# Patient Record
Sex: FEMALE | Race: WHITE | NOT HISPANIC OR LATINO | Employment: OTHER | URBAN - METROPOLITAN AREA
[De-identification: names, ages, dates, MRNs, and addresses within clinical notes are randomized per-mention and may not be internally consistent; named-entity substitution may affect disease eponyms.]

---

## 2020-02-05 ENCOUNTER — OFFICE VISIT (OUTPATIENT)
Dept: INTERNAL MEDICINE | Facility: CLINIC | Age: 25
End: 2020-02-05

## 2020-02-05 ENCOUNTER — HOSPITAL ENCOUNTER (OUTPATIENT)
Dept: GENERAL RADIOLOGY | Facility: HOSPITAL | Age: 25
Discharge: HOME | End: 2020-02-05
Admitting: NURSE PRACTITIONER

## 2020-02-05 VITALS
WEIGHT: 237.4 LBS | DIASTOLIC BLOOD PRESSURE: 78 MMHG | SYSTOLIC BLOOD PRESSURE: 122 MMHG | HEIGHT: 69 IN | RESPIRATION RATE: 18 BRPM | TEMPERATURE: 98.3 F | OXYGEN SATURATION: 99 % | HEART RATE: 85 BPM | BODY MASS INDEX: 35.16 KG/M2

## 2020-02-05 DIAGNOSIS — M25.561 CHRONIC PAIN OF BOTH KNEES: Primary | ICD-10-CM

## 2020-02-05 DIAGNOSIS — M25.562 CHRONIC PAIN OF BOTH KNEES: Primary | ICD-10-CM

## 2020-02-05 DIAGNOSIS — M25.561 CHRONIC PAIN OF BOTH KNEES: ICD-10-CM

## 2020-02-05 DIAGNOSIS — G89.29 CHRONIC PAIN OF BOTH KNEES: Primary | ICD-10-CM

## 2020-02-05 DIAGNOSIS — M25.562 CHRONIC PAIN OF BOTH KNEES: ICD-10-CM

## 2020-02-05 DIAGNOSIS — G89.29 CHRONIC PAIN OF BOTH KNEES: ICD-10-CM

## 2020-02-05 PROCEDURE — 99203 OFFICE O/P NEW LOW 30 MIN: CPT | Performed by: NURSE PRACTITIONER

## 2020-02-05 PROCEDURE — 73560 X-RAY EXAM OF KNEE 1 OR 2: CPT

## 2020-02-05 RX ORDER — DESVENLAFAXINE 100 MG/1
1 TABLET, EXTENDED RELEASE ORAL DAILY
COMMUNITY
End: 2020-11-25

## 2020-02-05 RX ORDER — AZELASTINE 1 MG/ML
SPRAY, METERED NASAL
COMMUNITY
Start: 2020-02-03 | End: 2020-06-03

## 2020-02-05 RX ORDER — LEVOCETIRIZINE DIHYDROCHLORIDE 5 MG/1
5 TABLET, FILM COATED ORAL EVERY EVENING
COMMUNITY
End: 2020-06-03

## 2020-02-05 RX ORDER — NAPROXEN 500 MG/1
500 TABLET ORAL 2 TIMES DAILY WITH MEALS
Qty: 60 TABLET | Refills: 0 | Status: SHIPPED | OUTPATIENT
Start: 2020-02-05 | End: 2020-03-04

## 2020-02-05 NOTE — PROGRESS NOTES
Subjective   Melania Stevens is a 24 y.o. female here to establish care.  Chief Complaint   Patient presents with   • Establish Care   • Knee Pain     bilateral knee pain since November       Knee Pain    The incident occurred more than 1 week ago. The injury mechanism is unknown. The pain is present in the right knee and left knee. The pain is at a severity of 8/10. The pain has been fluctuating since onset. Associated symptoms include a loss of motion. Pertinent negatives include no inability to bear weight, loss of sensation, muscle weakness, numbness or tingling. Associated symptoms comments: Difficulty going up and down steps  . The symptoms are aggravated by weight bearing. She has tried acetaminophen for the symptoms. The treatment provided no relief.   Patient reports that she has had injuries to her knees since 2014.  She had a left knee dislocation in 2014.  She underwent some extensive physical therapy which improved her symptoms.  She recently has been experiencing some more pain in that left knee and feels as though that it has dislocated again.  She is also began having pain in the right knee since then but feels that this may be compensatory.  She has had no recent imaging.  She has tried Tylenol over-the-counter and reports this not relieve her pain.  She denies any numbness or tingling.  She does have some difficulty going up and down steps and feels as though the knee itself is weak but it does not buckle or give way.  She feels as though that her weight has been increasing over the last several years and this is contributing to the ongoing knee pain.    The following portions of the patient's history were reviewed and updated as appropriate: allergies, current medications, past family history, past medical history, past social history, past surgical history and problem list.    Review of Systems   Constitutional: Negative for appetite change, chills, fatigue and unexpected weight change.   HENT:  Negative for ear pain and sore throat.    Respiratory: Negative for cough, chest tightness, shortness of breath and wheezing.    Cardiovascular: Negative for chest pain, palpitations and leg swelling.   Gastrointestinal: Negative for abdominal pain, constipation, diarrhea, nausea and vomiting.   Genitourinary: Negative for difficulty urinating and dysuria.   Musculoskeletal: Positive for arthralgias, gait problem and joint swelling. Negative for back pain, myalgias, neck pain and neck stiffness.   Skin: Negative for color change and rash.   Neurological: Positive for weakness ( Left knee). Negative for dizziness, tingling, syncope, facial asymmetry, numbness and headaches.   Psychiatric/Behavioral: Negative for sleep disturbance.         No Known Allergies  Past Medical History:   Diagnosis Date   • Depression    • Fractures    • Pap smear for cervical cancer screening      Past Surgical History:   Procedure Laterality Date   • WISDOM TOOTH EXTRACTION       Family History   Problem Relation Age of Onset   • Migraines Mother    • Stroke Paternal Uncle    • Arthritis Maternal Grandmother      Social History     Socioeconomic History   • Marital status: Single     Spouse name: Not on file   • Number of children: Not on file   • Years of education: Not on file   • Highest education level: Not on file   Tobacco Use   • Smoking status: Never Smoker   • Smokeless tobacco: Never Used   Substance and Sexual Activity   • Alcohol use: Never     Frequency: Never     Immunization History   Administered Date(s) Administered   • Tdap 01/01/2015   • flucelvax quad pfs =>4 YRS 12/07/2019       Current Outpatient Medications:   •  azelastine (ASTELIN) 0.1 % nasal spray, , Disp: , Rfl:   •  Desvenlafaxine  MG tablet sustained-release 24 hour, Take 1 tablet by mouth Daily., Disp: , Rfl:   •  levocetirizine (XYZAL) 5 MG tablet, Take 5 mg by mouth Every Evening., Disp: , Rfl:   •  Multiple Vitamins-Minerals (MULTIVITAMIN ADULT PO),  "Take 1 tablet by mouth Daily., Disp: , Rfl:   •  naproxen (NAPROSYN) 500 MG tablet, Take 1 tablet by mouth 2 (Two) Times a Day With Meals., Disp: 60 tablet, Rfl: 0    Objective   Blood pressure 122/78, pulse 85, temperature 98.3 °F (36.8 °C), resp. rate 18, height 175.9 cm (69.25\"), weight 108 kg (237 lb 6.4 oz), last menstrual period 01/20/2020, SpO2 99 %, not currently breastfeeding.  Physical Exam   Constitutional: She is oriented to person, place, and time. She appears well-developed and well-nourished.   HENT:   Head: Normocephalic and atraumatic.   Eyes: Pupils are equal, round, and reactive to light. Conjunctivae are normal.   Neck: Normal range of motion.   Cardiovascular: Normal rate, regular rhythm and normal heart sounds.   Pulmonary/Chest: Effort normal and breath sounds normal.   Abdominal: Soft. Normal appearance and bowel sounds are normal. There is no tenderness.   Musculoskeletal:        Right knee: She exhibits effusion. She exhibits normal range of motion, no swelling, no ecchymosis, no deformity, no laceration, no erythema, normal alignment, no LCL laxity, normal patellar mobility, no bony tenderness, normal meniscus and no MCL laxity. No tenderness found.        Left knee: She exhibits decreased range of motion, swelling and effusion. She exhibits no ecchymosis, no deformity, no laceration, no erythema, normal alignment, no LCL laxity, normal patellar mobility, no bony tenderness, normal meniscus and no MCL laxity. No tenderness found.   Neurological: She is alert and oriented to person, place, and time.   Skin: Skin is warm and dry.   Psychiatric: She has a normal mood and affect. Her behavior is normal. Judgment and thought content normal.       Assessment/Plan   Melania was seen today for establish care and knee pain.    Diagnoses and all orders for this visit:    Chronic pain of both knees  -     XR Knee 1 or 2 View Bilateral; Future  -     naproxen (NAPROSYN) 500 MG tablet; Take 1 tablet by " mouth 2 (Two) Times a Day With Meals.  -     Ambulatory Referral to Orthopedic Surgery    Will check x-rays of her knees, start on naproxen for symptomatic relief.  Referred to orthopedics for further management.       Return in about 6 weeks (around 3/18/2020) for Annual, with fasting labs.  Plan of care discussed with pt. They verbalized understanding and agreement.       * Please note that portions of this note were completed with a voice recognition program. Efforts were made to edit the dictation but occasionally words are erroneously transcribed.

## 2020-02-07 ENCOUNTER — TELEPHONE (OUTPATIENT)
Dept: INTERNAL MEDICINE | Facility: CLINIC | Age: 25
End: 2020-02-07

## 2020-02-07 NOTE — TELEPHONE ENCOUNTER
----- Message from DOMENIC Wilkinson sent at 2/6/2020  9:16 PM EST -----  Please let her know knee xr show no dislocation but do show effusion (fluid). Will have her see ortho as planned.

## 2020-02-10 ENCOUNTER — TELEPHONE (OUTPATIENT)
Dept: INTERNAL MEDICINE | Facility: CLINIC | Age: 25
End: 2020-02-10

## 2020-02-10 RX ORDER — DESVENLAFAXINE 100 MG/1
1 TABLET, EXTENDED RELEASE ORAL DAILY
Qty: 30 TABLET | Status: CANCELLED | OUTPATIENT
Start: 2020-02-10

## 2020-02-10 NOTE — TELEPHONE ENCOUNTER
Pt says her psychiatrist is now out her network and can't get her medication refilled.  Pt is wanting to see if [facility] will refill it for her.  Pt is requesting her Desvenlafaxine ER refilled and have it sent to [facility].  Pt is requesting a call back to know whether it can be filled or not.

## 2020-02-11 NOTE — TELEPHONE ENCOUNTER
Pt should still be able to get refilled from her psychiatrist until she can establish with a new one. She can use [facility] as well if it is a cost issue at the pharmacy   I would have to see her to discuss before I could prescribe since this was not addressed by me.

## 2020-02-11 NOTE — TELEPHONE ENCOUNTER
PN.  She stated understanding and will try to call him to get a refill.  She will call back to schedule an appointment to discuss

## 2020-02-12 ENCOUNTER — OFFICE VISIT (OUTPATIENT)
Dept: ORTHOPEDIC SURGERY | Facility: CLINIC | Age: 25
End: 2020-02-12

## 2020-02-12 VITALS — OXYGEN SATURATION: 100 % | BODY MASS INDEX: 35.27 KG/M2 | HEIGHT: 69 IN | HEART RATE: 100 BPM | WEIGHT: 238.1 LBS

## 2020-02-12 DIAGNOSIS — M25.562 PAIN IN BOTH KNEES, UNSPECIFIED CHRONICITY: Primary | ICD-10-CM

## 2020-02-12 DIAGNOSIS — M25.561 PAIN IN BOTH KNEES, UNSPECIFIED CHRONICITY: Primary | ICD-10-CM

## 2020-02-12 DIAGNOSIS — M25.362 PATELLAR INSTABILITY OF LEFT KNEE: ICD-10-CM

## 2020-02-12 PROCEDURE — 99204 OFFICE O/P NEW MOD 45 MIN: CPT | Performed by: ORTHOPAEDIC SURGERY

## 2020-02-12 NOTE — PROGRESS NOTES
[facility] [place] [place] [facility] [place]    Subjective     Chief Complaint   Patient presents with   • Left Knee - Pain   • Right Knee - Pain        HPI  Melania Stevens is a 24 y.o. female who presents with bilateral knee pain.  Onset: twisting injury. The issue has been ongoing for 3 month(s). Pain is a 8/10 on the pain scale. Pain is described as dull, aching, throbbing, stabbing and shooting. Associated symptoms include pain, swelling, popping, grinding, stiffness and giving way/buckling. The pain is worse with standing, sitting and climbing stairs; ice, heat and pain medication and/or NSAID improve the pain. Previous treatments have included: bracing and NSAIDS.    I have reviewed the following portions of the patient's history:History of Present Illness    She initially had a patella dislocation 2014.  She was treated with physical therapy and bracing.  She has had multiple dislocations since November 2019.  She has failed bracing and therapy.  She has not had an MRI recently.  She is tried naproxen.      Past Medical History:   Diagnosis Date   • Depression    • Fractures    • Pap smear for cervical cancer screening       Past Surgical History:   Procedure Laterality Date   • WISDOM TOOTH EXTRACTION        Family History   Problem Relation Age of Onset   • Migraines Mother    • Stroke Paternal Uncle    • Arthritis Maternal Grandmother      Social History     Socioeconomic History   • Marital status: Single     Spouse name: Not on file   • Number of children: Not on file   • Years of education: Not on file   • Highest education level: Not on file   Tobacco Use   • Smoking status: Never Smoker   • Smokeless tobacco: Never Used   Substance and Sexual Activity   • Alcohol use: Never     Frequency: Never      Current Outpatient Medications on File Prior to Visit   Medication Sig Dispense Refill   • azelastine (ASTELIN) 0.1 % nasal spray      • Desvenlafaxine  MG tablet sustained-release 24 hour Take 1 tablet  "by mouth Daily.     • levocetirizine (XYZAL) 5 MG tablet Take 5 mg by mouth Every Evening.     • Multiple Vitamins-Minerals (MULTIVITAMIN ADULT PO) Take 1 tablet by mouth Daily.     • naproxen (NAPROSYN) 500 MG tablet Take 1 tablet by mouth 2 (Two) Times a Day With Meals. 60 tablet 0     No current facility-administered medications on file prior to visit.       No Known Allergies     The following portions of the patient's history were reviewed and updated as appropriate: allergies, current medications, past family history, past medical history, past social history, past surgical history and problem list.    Review of Systems   Constitutional: Negative.    HENT: Negative.    Eyes: Negative.    Respiratory: Negative.    Cardiovascular: Negative.    Gastrointestinal: Negative.    Endocrine: Negative.    Genitourinary: Negative.    Musculoskeletal: Positive for arthralgias.   Skin: Negative.    Allergic/Immunologic: Negative.    Neurological: Negative.    Hematological: Negative.    Psychiatric/Behavioral: Negative.         Objective      Physical Exam  Pulse 100   Ht 175.9 cm (69.25\")   Wt 108 kg (238 lb 1.6 oz)   LMP 01/20/2020 (Approximate)   SpO2 100%   BMI 34.91 kg/m²     Body mass index is 34.91 kg/m².    GENERAL APPEARANCE: awake, alert & oriented x 3, in no acute distress and well developed, well nourished  PSYCH: normal mood and affect  LUNGS:  breathing nonlabored, no wheezing  EYES: sclera anicteric, pupils equal  CARDIOVASCULAR: palpable pulses dorsalis pedis, palpable posterior tibial bilaterally. Capillary refill less than 2 seconds  INTEGUMENTARY: skin intact, no clubbing, cyanosis  NEUROLOGIC:  Normal Sensation and reflexes       Ortho Exam  Peripheral Vascular:    Upper Extremity:   Inspection:  Left--no cyanotic nail beds Right--no cyanotic nail beds   Bilateral:  Pink nail beds with brisk capillary refill   Palpation:  Bilateral radial pulse normal    Musculoskeletal:  Global Assessment:  " Overall assessment of Lower Extremity Muscle Strength and Tone:  Left quadriceps--5/5   Left hamstrings--5/5       Left tibialis anterior--5/5  Left gastroc-soleus--5/5  Left EHL--5/5    Right quadriceps--5/5  Right hamstrings--5/5  Right tibialis anterior--5/5  Right gastroc soleus--5/5  Right EHL--5/5    Lower Extremity:  Knee/Patella:  No digital clubbing or cyanosis.    Examination of left and right knees reveals:  Normal deep tendon reflexes, coordination, strength, tone, sensation.  No known fractures or deformities.    Inspection and Palpation:    Left knee:  Tenderness:  none  Effusion:  none  Crepitus:  none  Pulses:  2+  Ecchymosis:  None  Warmth:  None     Right knee:  Tenderness:  none  Effusion:  none  Crepitus:  none  Pulses:  2+  Ecchymosis:  None  Warmth:  None     ROM:  Right:  Extension:0    Flexion:135  Left:  Extension:0     Flexion:135    Instability:    Left:  Lachman Test:  Negative, Varus stress test negative, Valgus stress test negative   Anterior Drawer Test:  Negative, Posterior Drawer Test:  Negative    Right:  Lachman Test:  Negative, Varus stress test negative, Valgus stress test negative   Anterior Drawer Test:  Negative, Posterior Drawer Test:  Negative    Deformities/Malalignments/Discrepancies:    Left:  none  Right:  none    Functional Testing:  Right:  Yamini's test:  Negative  Patella grind test:  Negative  Q-angle:  Normal  Apprehension Sign:  Negative      Left:  Yamini's test:  Negative  Patella grind test:  Negative  Q-angle:  Normal  Apprehension Sign:  Negative    Imaging/Studies  Imaging Results (Last 7 Days)     Procedure Component Value Units Date/Time    XR Knee 1 or 2 View Bilateral [[id number]] Resulted:  02/12/20 0930     Updated:  02/12/20 0931    Narrative:       Bilateral Knee X-Rays  Indication: Pain    Upright AP, Lateral, skiers and   Findings: Evidence of prior healed fracture of MPFL injury to left patella     No bony lesion  Normal soft tissues  Normal  joint spaces    No prior studies were available for comparison.            I viewed knee x-rays from February 6 AP and lateral with no acute pathology  Assessment/Plan        ICD-10-CM ICD-9-CM   1. Pain in both knees, unspecified chronicity M25.561 719.46    M25.562    2. Patellar instability of left knee M25.362 718.86       Orders Placed This Encounter   Procedures   • XR Knee 1 or 2 View Bilateral   • MRI Knee Left Without Contrast      She has a history of chronic left patella instability with an initial dislocation in 2014 and recurrent dislocation in November 2019.  She is failed prior physical therapy and bracing.  Now she has significant recurrent dislocations left knee.  I have ordered an MRI to evaluate the integrity of the MPFL and will see her back after that.  She is going to need a surgical reconstruction of her MPFL.  I have placed her in a brace for stability until we can get the MRI    Medical Decision Making  Management Options : over-the-counter medicine and close treatment of fracture or dislocation  Data/Risk: radiology tests and independent visualization of imaging, lab tests, or EMG/NCV    Juanito Dai MD  02/12/20  9:40 AM         EMR Dragon/Transcription disclaimer:  Much of this encounter note is an electronic transcription of spoken language to printed text. Electronic transcription of spoken language may permit erroneous, or at times, nonsensical words or phrases to be inadvertently transcribed. Although I have reviewed the note for such errors, some may still exist.      Answers for HPI/ROS submitted by the patient on 2/10/2020   Lower extremity pain  What is the primary reason for your visit?: Lower Extremity Injury  Incident occurred: more than 1 week ago  Incident location: other  Injury mechanism: a fall, a twisting injury, other  Pain location: left knee, right knee  Pain quality: aching, shooting, stabbing  Pain - numeric: 7/10  Pain course: worsening  tingling:  No  inability to bear weight: Yes  loss of motion: Yes  loss of sensation: No  muscle weakness: Yes  Foreign body present: no foreign bodies  Aggravated by: movement, palpation, weight bearing

## 2020-02-25 DIAGNOSIS — M25.362 PATELLAR INSTABILITY OF LEFT KNEE: ICD-10-CM

## 2020-02-26 ENCOUNTER — OFFICE VISIT (OUTPATIENT)
Dept: ORTHOPEDIC SURGERY | Facility: CLINIC | Age: 25
End: 2020-02-26

## 2020-02-26 VITALS — BODY MASS INDEX: 35.27 KG/M2 | OXYGEN SATURATION: 100 % | WEIGHT: 238.1 LBS | HEIGHT: 69 IN | HEART RATE: 110 BPM

## 2020-02-26 DIAGNOSIS — M25.561 PAIN IN BOTH KNEES, UNSPECIFIED CHRONICITY: ICD-10-CM

## 2020-02-26 DIAGNOSIS — M25.562 PAIN IN BOTH KNEES, UNSPECIFIED CHRONICITY: ICD-10-CM

## 2020-02-26 DIAGNOSIS — M25.362 PATELLAR INSTABILITY OF LEFT KNEE: Primary | ICD-10-CM

## 2020-02-26 DIAGNOSIS — Q68.2 PATELLA ALTA: ICD-10-CM

## 2020-02-26 PROCEDURE — 99213 OFFICE O/P EST LOW 20 MIN: CPT | Performed by: ORTHOPAEDIC SURGERY

## 2020-02-26 NOTE — PROGRESS NOTES
[facility] – [place] [facility] Note    Subjective     Chief Complaint   Patient presents with   • Follow-up     2 week follow up to MRI (2/24/20) Left knee - Patellar instability of left knee and Chronic pain of both knees        HPI  Melania Stevens is a 24 y.o. female.  She is follow-up MRI left knee.  She is had patella dislocations and knee problems since childhood.  Pain is 6 out of 10 now.    Past Medical History:   Diagnosis Date   • Depression    • Fractures    • Pap smear for cervical cancer screening       Past Surgical History:   Procedure Laterality Date   • WISDOM TOOTH EXTRACTION        Family History   Problem Relation Age of Onset   • Migraines Mother    • Stroke Paternal Uncle    • Arthritis Maternal Grandmother      Social History     Socioeconomic History   • Marital status: Single     Spouse name: Not on file   • Number of children: Not on file   • Years of education: Not on file   • Highest education level: Not on file   Tobacco Use   • Smoking status: Never Smoker   • Smokeless tobacco: Never Used   Substance and Sexual Activity   • Alcohol use: Never     Frequency: Never      Current Outpatient Medications on File Prior to Visit   Medication Sig Dispense Refill   • amoxicillin-clavulanate (AUGMENTIN) 875-125 MG per tablet Take 1 tablet by mouth 2 (Two) Times a Day for 7 days. 14 tablet 0   • azelastine (ASTELIN) 0.1 % nasal spray      • Desvenlafaxine  MG tablet sustained-release 24 hour Take 1 tablet by mouth Daily.     • levocetirizine (XYZAL) 5 MG tablet Take 5 mg by mouth Every Evening.     • Multiple Vitamins-Minerals (MULTIVITAMIN ADULT PO) Take 1 tablet by mouth Daily.     • naproxen (NAPROSYN) 500 MG tablet Take 1 tablet by mouth 2 (Two) Times a Day With Meals. 60 tablet 0     No current facility-administered medications on file prior to visit.       No Known Allergies     The following portions of the patient's history were reviewed and updated as appropriate: allergies,  "current medications, past family history, past medical history, past social history, past surgical history and problem list.    Review of Systems   Constitutional: Negative.    HENT: Positive for sinus pressure, sneezing and sore throat.    Eyes: Negative.    Respiratory: Negative.    Cardiovascular: Negative.    Gastrointestinal: Negative.    Endocrine: Negative.    Genitourinary: Negative.    Musculoskeletal: Positive for arthralgias, back pain and joint swelling.   Skin: Negative.    Allergic/Immunologic: Positive for environmental allergies.   Neurological: Negative.    Hematological: Negative.    Psychiatric/Behavioral: Negative.         Objective      Physical Exam  Pulse 110   Ht 175.9 cm (69.25\")   Wt 108 kg (238 lb 1.6 oz)   SpO2 100%   BMI 34.91 kg/m²     Body mass index is 34.91 kg/m².    GENERAL APPEARANCE: awake, alert & oriented x 3, in no acute distress and well developed, well nourished  PSYCH: normal mood and affect  No change in her exam.  She has patella rupinder with laxity of MPFL    Imaging/Studies  Imaging Results (Last 7 Days)     ** No results found for the last 168 hours. **      I viewed her MRI from February 24 which shows patella rupinder no ligament tears and a bone bruise.  She also has trochlear dysplasia    Assessment/Plan        ICD-10-CM ICD-9-CM   1. Patellar instability of left knee M25.362 718.86   2. Pain in both knees, unspecified chronicity M25.561 719.46    M25.562    3. 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  She does not have a tear to the medial patellofemoral ligament but it appears lax.  I recommend physical therapy with VMO strengthening.  Continue her patella stabilizing knee sleeve and follow-up in a month.  If she fails additional therapy surgical reconstruction of " the MPFL is an option.  Surgery may not have a great result in her case because of the patella rupinder trochlear dysplasia  Medical Decision Making  Management Options : over-the-counter medicine and physical/occupational therapy  Data/Risk: radiology tests and independent visualization of imaging, lab tests, or EMG/NCV    Juanito Dai MD  02/26/20  9:33 AM         EMR Dragon/Transcription disclaimer:  Much of this encounter note is an electronic transcription of spoken language to printed text. Electronic transcription of spoken language may permit erroneous, or at times, nonsensical words or phrases to be inadvertently transcribed. Although I have reviewed the note for such errors, some may still exist.

## 2020-03-04 DIAGNOSIS — G89.29 CHRONIC PAIN OF BOTH KNEES: ICD-10-CM

## 2020-03-04 DIAGNOSIS — M25.562 CHRONIC PAIN OF BOTH KNEES: ICD-10-CM

## 2020-03-04 DIAGNOSIS — M25.561 CHRONIC PAIN OF BOTH KNEES: ICD-10-CM

## 2020-03-04 RX ORDER — NAPROXEN 500 MG/1
TABLET ORAL
Qty: 60 TABLET | Refills: 0 | Status: SHIPPED | OUTPATIENT
Start: 2020-03-04 | End: 2020-04-06

## 2020-03-17 ENCOUNTER — OFFICE VISIT (OUTPATIENT)
Dept: INTERNAL MEDICINE | Facility: CLINIC | Age: 25
End: 2020-03-17

## 2020-03-17 VITALS
HEIGHT: 68 IN | WEIGHT: 238 LBS | DIASTOLIC BLOOD PRESSURE: 86 MMHG | OXYGEN SATURATION: 99 % | TEMPERATURE: 97.3 F | RESPIRATION RATE: 16 BRPM | BODY MASS INDEX: 36.07 KG/M2 | HEART RATE: 93 BPM | SYSTOLIC BLOOD PRESSURE: 126 MMHG

## 2020-03-17 DIAGNOSIS — E55.9 VITAMIN D DEFICIENCY: ICD-10-CM

## 2020-03-17 DIAGNOSIS — Z12.4 CERVICAL CANCER SCREENING: ICD-10-CM

## 2020-03-17 DIAGNOSIS — F31.9 BIPOLAR DEPRESSION (HCC): ICD-10-CM

## 2020-03-17 DIAGNOSIS — Z00.00 ANNUAL PHYSICAL EXAM: Primary | ICD-10-CM

## 2020-03-17 DIAGNOSIS — E66.09 CLASS 2 OBESITY DUE TO EXCESS CALORIES WITHOUT SERIOUS COMORBIDITY WITH BODY MASS INDEX (BMI) OF 36.0 TO 36.9 IN ADULT: ICD-10-CM

## 2020-03-17 DIAGNOSIS — R53.82 CHRONIC FATIGUE: ICD-10-CM

## 2020-03-17 DIAGNOSIS — E53.8 B12 DEFICIENCY: ICD-10-CM

## 2020-03-17 PROCEDURE — 99395 PREV VISIT EST AGE 18-39: CPT | Performed by: NURSE PRACTITIONER

## 2020-03-17 NOTE — PROGRESS NOTES
Patient Care Team:  She Beaver APRN as PCP - General (Nurse Practitioner)  Sung Bang Jr., MD (Psychiatry)  Juanito Dai MD as Consulting Physician (Orthopedic Surgery)     Chief complaint: Patient is in today for a physical          Patient is a 24 y.o. female who presents for her yearly physical exam.     HPI      Chronic knee pain - getting better with PT. Follow ing with Dr. Dai    Chronic fatigue, weight gain, poor diet. Eating healthier for the last 2 weeks and feels a bit better.     Health maintenance/lifestyle:    Influenza: 2019  Tdap: 2015  SBE- does not do regularly, no concerning areas.     Pap: due  Menses:Patient's last menstrual period was 03/10/2020 (approximate).      Diet: recently started eating healthier  Caffeine: none  Exercise: has recently started doing low impact exercises  Follows with psych for chronic bipolar depression. Currently on desvenlafaxine.  Reports she feels like symptoms are fairly well controlled.    PHQ-9 Depression Screening  Little interest or pleasure in doing things? 1(bipolar depression)   Feeling down, depressed, or hopeless? 1   Trouble falling or staying asleep, or sleeping too much? 1   Feeling tired or having little energy? 3   Poor appetite or overeating? 2   Feeling bad about yourself - or that you are a failure or have let yourself or your family down? 1   Trouble concentrating on things, such as reading the newspaper or watching television? 3   Moving or speaking so slowly that other people could have noticed? Or the opposite - being so fidgety or restless that you have been moving around a lot more than usual? 1   Thoughts that you would be better off dead, or of hurting yourself in some way? 0   PHQ-9 Total Score 13   If you checked off any problems, how difficult have these problems made it for you to do your work, take care of things at home, or get along with other people? Somewhat difficult         Review of Systems    Constitutional: Positive for fatigue. Negative for appetite change, chills, diaphoresis, fever and unexpected weight change.   HENT: Negative for congestion, ear pain, rhinorrhea, sinus pressure and sore throat.    Eyes: Negative for itching and visual disturbance.   Respiratory: Negative for cough, chest tightness, shortness of breath and wheezing.    Cardiovascular: Negative for chest pain, palpitations and leg swelling.   Gastrointestinal: Negative for abdominal pain, constipation, diarrhea, nausea and vomiting.        Occasional heartburn/reflux   Endocrine: Negative for cold intolerance, heat intolerance, polydipsia, polyphagia and polyuria.   Genitourinary: Negative for difficulty urinating, dysuria and hematuria.   Musculoskeletal: Negative for arthralgias, back pain, joint swelling and myalgias.   Skin: Negative for color change, rash and wound.   Allergic/Immunologic: Negative for environmental allergies and food allergies.   Neurological: Negative for dizziness, syncope, weakness, light-headedness, numbness and headaches.   Hematological: Negative for adenopathy. Does not bruise/bleed easily.   Psychiatric/Behavioral: Positive for decreased concentration. Negative for agitation, behavioral problems, dysphoric mood and sleep disturbance. The patient is nervous/anxious.            History  Past Medical History:   Diagnosis Date   • Allergic as a kid    seasonal chronic acute sinusitis - some times leads to strep   • Anxiety first noticed in middle school    coping   • Depression    • Fractures    • Headache middle school    now know triggers to avoid-unable to avoid during pd & wk bf   • Pap smear for cervical cancer screening       Past Surgical History:   Procedure Laterality Date   • WISDOM TOOTH EXTRACTION        No Known Allergies   Family History   Problem Relation Age of Onset   • Migraines Mother    • Miscarriages / Stillbirths Mother         abt 3 miscarriages bf me   • Stroke Mother         TIA -  "inconclusive, but potentially stress related   • Arthritis Father    • Mental illness Father         ADHD (some anxiety managed in that)   • Stroke Paternal Uncle         heavy stress & unhealthy eating clogged 1 or 2 heart valves   • Arthritis Maternal Grandmother      Social History     Socioeconomic History   • Marital status: Single     Spouse name: Not on file   • Number of children: Not on file   • Years of education: Not on file   • Highest education level: Not on file   Tobacco Use   • Smoking status: Never Smoker   • Smokeless tobacco: Never Used   Substance and Sexual Activity   • Alcohol use: Not Currently     Alcohol/week: 0.0 standard drinks     Frequency: Never     Comment: very rarely/on occasion   • Drug use: Never   • Sexual activity: Never        Current Outpatient Medications:   •  azelastine (ASTELIN) 0.1 % nasal spray, , Disp: , Rfl:   •  Desvenlafaxine  MG tablet sustained-release 24 hour, Take 1 tablet by mouth Daily., Disp: , Rfl:   •  levocetirizine (XYZAL) 5 MG tablet, Take 5 mg by mouth Every Evening., Disp: , Rfl:   •  Multiple Vitamins-Minerals (MULTIVITAMIN ADULT PO), Take 1 tablet by mouth Daily., Disp: , Rfl:   •  naproxen (NAPROSYN) 500 MG tablet, TAKE ONE TABLET BY MOUTH TWICE A DAY WITH MEALS, Disp: 60 tablet, Rfl: 0   Immunization History   Administered Date(s) Administered   • Tdap 01/01/2015   • flucelvax quad pfs =>4 YRS 12/07/2019                   /86   Pulse 93   Temp 97.3 °F (36.3 °C)   Resp 16   Ht 172.7 cm (68\")   Wt 108 kg (238 lb)   LMP 03/10/2020 (Approximate)   SpO2 99%   Breastfeeding No   BMI 36.19 kg/m²       Physical Exam   Constitutional: She is oriented to person, place, and time. She appears well-developed and well-nourished. No distress.   HENT:   Head: Normocephalic and atraumatic.   Right Ear: External ear normal.   Left Ear: External ear normal.   Mouth/Throat: Oropharynx is clear and moist. No oropharyngeal exudate.   Eyes: " Conjunctivae and EOM are normal. Right eye exhibits no discharge. Left eye exhibits no discharge. No scleral icterus.   Neck: Normal range of motion. Neck supple. No JVD (no bruits) present. No tracheal deviation present. No thyromegaly present.   Cardiovascular: Normal rate, regular rhythm, normal heart sounds and intact distal pulses. Exam reveals no friction rub.   No murmur heard.  Pulmonary/Chest: Effort normal and breath sounds normal. No respiratory distress. She has no wheezes. She has no rales. She exhibits no tenderness. Right breast exhibits no inverted nipple, no mass, no nipple discharge, no skin change and no tenderness. Left breast exhibits no inverted nipple, no mass, no nipple discharge, no skin change and no tenderness. Breasts are symmetrical.   Abdominal: Soft. Normal appearance and bowel sounds are normal. She exhibits no distension and no mass. There is no hepatosplenomegaly. There is no tenderness. No hernia.   Genitourinary: Vagina normal and uterus normal. There is no rash, tenderness, lesion or injury on the right labia. There is no rash, tenderness, lesion or injury on the left labia. Cervix exhibits no motion tenderness, no discharge and no friability. Right adnexum displays no mass, no tenderness and no fullness. Left adnexum displays no mass, no tenderness and no fullness.   Musculoskeletal: Normal range of motion. She exhibits no edema, tenderness or deformity.   Lymphadenopathy:     She has no cervical adenopathy.   Neurological: She is alert and oriented to person, place, and time. She has normal reflexes. She displays normal reflexes.   Skin: Skin is warm and dry. No rash noted. She is not diaphoretic. No erythema. No pallor.   Psychiatric: She has a normal mood and affect. Her behavior is normal. Thought content normal.   Nursing note and vitals reviewed.                Diagnoses and all orders for this visit:    Annual physical exam  -     CBC (No Diff)  -     Comprehensive  Metabolic Panel  -     Lipid Panel  -     TSH  -     Vitamin B12  -     Vitamin D 25 Hydroxy  -     Liquid-based Pap Smear, Screening    Vitamin D deficiency  -     Vitamin D 25 Hydroxy    B12 deficiency  -     Vitamin B12    Chronic fatigue  -     CBC (No Diff)  -     Comprehensive Metabolic Panel  -     Lipid Panel  -     TSH  -     Vitamin B12  -     Vitamin D 25 Hydroxy    Cervical cancer screening  -     Liquid-based Pap Smear, Screening    Bipolar depression (CMS/HCC)  -     CBC (No Diff)  -     Comprehensive Metabolic Panel  -     Lipid Panel  -     TSH  -     Vitamin B12  -     Vitamin D 25 Hydroxy    Class 2 obesity due to excess calories without serious comorbidity with body mass index (BMI) of 36.0 to 36.9 in adult         Labs sent as above- will notify of results and treat accordingly. If patient has not received results within one week, they will notify my office    Immunizations and screenings : Up-to-date as noted in the HPI, Pap and breast exam completed in office today  Counseling: Diet and exercise for weight reduction  Follow up: Return in about 1 year (around 3/17/2021) for Annual.  Plan of care discussed with pt. They verbalized understanding and agreement.     She Beaver, APRN              * Please note that portions of this note were completed with a voice recognition program. Efforts were made to edit the dictation but occasionally words are erroneously transcribed.

## 2020-03-18 ENCOUNTER — TELEPHONE (OUTPATIENT)
Dept: INTERNAL MEDICINE | Facility: CLINIC | Age: 25
End: 2020-03-18

## 2020-03-18 LAB
25(OH)D3+25(OH)D2 SERPL-MCNC: 35.8 NG/ML (ref 30–100)
ALBUMIN SERPL-MCNC: 4.5 G/DL (ref 3.5–5.2)
ALBUMIN/GLOB SERPL: 2 G/DL
ALP SERPL-CCNC: 56 U/L (ref 39–117)
ALT SERPL-CCNC: 19 U/L (ref 1–33)
AST SERPL-CCNC: 16 U/L (ref 1–32)
BILIRUB SERPL-MCNC: 0.5 MG/DL (ref 0.2–1.2)
BUN SERPL-MCNC: 10 MG/DL (ref 6–20)
BUN/CREAT SERPL: 11.9 (ref 7–25)
CALCIUM SERPL-MCNC: 9.4 MG/DL (ref 8.6–10.5)
CHLORIDE SERPL-SCNC: 104 MMOL/L (ref 98–107)
CHOLEST SERPL-MCNC: 204 MG/DL (ref 0–200)
CO2 SERPL-SCNC: 22.3 MMOL/L (ref 22–29)
CREAT SERPL-MCNC: 0.84 MG/DL (ref 0.57–1)
ERYTHROCYTE [DISTWIDTH] IN BLOOD BY AUTOMATED COUNT: 13.6 % (ref 12.3–15.4)
GLOBULIN SER CALC-MCNC: 2.2 GM/DL
GLUCOSE SERPL-MCNC: 94 MG/DL (ref 65–99)
HCT VFR BLD AUTO: 44 % (ref 34–46.6)
HDLC SERPL-MCNC: 59 MG/DL (ref 40–60)
HGB BLD-MCNC: 15.2 G/DL (ref 12–15.9)
LDLC SERPL CALC-MCNC: 113 MG/DL (ref 0–100)
MCH RBC QN AUTO: 28.5 PG (ref 26.6–33)
MCHC RBC AUTO-ENTMCNC: 34.5 G/DL (ref 31.5–35.7)
MCV RBC AUTO: 82.4 FL (ref 79–97)
PLATELET # BLD AUTO: 252 10*3/MM3 (ref 140–450)
POTASSIUM SERPL-SCNC: 4.4 MMOL/L (ref 3.5–5.2)
PROT SERPL-MCNC: 6.7 G/DL (ref 6–8.5)
RBC # BLD AUTO: 5.34 10*6/MM3 (ref 3.77–5.28)
SODIUM SERPL-SCNC: 139 MMOL/L (ref 136–145)
TRIGL SERPL-MCNC: 159 MG/DL (ref 0–150)
TSH SERPL DL<=0.005 MIU/L-ACNC: 3.27 UIU/ML (ref 0.27–4.2)
VIT B12 SERPL-MCNC: 505 PG/ML (ref 211–946)
VLDLC SERPL CALC-MCNC: 31.8 MG/DL
WBC # BLD AUTO: 4.34 10*3/MM3 (ref 3.4–10.8)

## 2020-03-18 NOTE — TELEPHONE ENCOUNTER
----- Message from DOMENIC Wilkinson sent at 3/18/2020 12:47 PM EDT -----  Please let the patient know that her labs are stable/in acceptable ranges except her cholesterol is elevated.  I would recommend healthy diet and exercise for weight reduction to treat the elevation in her cholesterol.  And we can recheck in about 6 months or so  Patient should consume lean meats, whole grains, vegetables, and fruits, while avoiding concentrated sweets, junk foods, and fast foods.  Should also be sure to consume plenty of water.  Patient should engage in a minimum of 150 minutes of moderate intensity exercise per week as well.

## 2020-03-20 ENCOUNTER — TELEPHONE (OUTPATIENT)
Dept: INTERNAL MEDICINE | Facility: CLINIC | Age: 25
End: 2020-03-20

## 2020-03-20 NOTE — TELEPHONE ENCOUNTER
----- Message from DOMENIC Wilkinson sent at 3/20/2020  9:11 AM EDT -----  Please let her know that her Pap was normal.

## 2020-04-05 DIAGNOSIS — G89.29 CHRONIC PAIN OF BOTH KNEES: ICD-10-CM

## 2020-04-05 DIAGNOSIS — M25.561 CHRONIC PAIN OF BOTH KNEES: ICD-10-CM

## 2020-04-05 DIAGNOSIS — M25.562 CHRONIC PAIN OF BOTH KNEES: ICD-10-CM

## 2020-04-06 RX ORDER — NAPROXEN 500 MG/1
TABLET ORAL
Qty: 60 TABLET | Refills: 1 | Status: SHIPPED | OUTPATIENT
Start: 2020-04-06 | End: 2020-06-01

## 2020-04-20 ENCOUNTER — TELEMEDICINE (OUTPATIENT)
Dept: ORTHOPEDIC SURGERY | Facility: CLINIC | Age: 25
End: 2020-04-20

## 2020-04-20 DIAGNOSIS — Q68.2 PATELLA ALTA: ICD-10-CM

## 2020-04-20 DIAGNOSIS — M25.362 PATELLAR INSTABILITY OF LEFT KNEE: Primary | ICD-10-CM

## 2020-04-20 PROCEDURE — 99213 OFFICE O/P EST LOW 20 MIN: CPT | Performed by: ORTHOPAEDIC SURGERY

## 2020-04-20 NOTE — PROGRESS NOTES
[facility] [place]    Subjective     Chief Complaint   Patient presents with   • Follow-up     2 month f/u; Patellar instability of left knee      You have chosen to receive care through a telephone visit. Do you consent to use a telephone visit for your medical care today? Yes  HPI  Melania Stevens is a 24 y.o. female.  This was scheduled as a video but we had to convert to telephone because the patient cannot get the video function to work.  She says she is doing about the same.  Pain 5 out of 10.  She is doing therapy at home.  She is better since last visit.  She is had multiple patella dislocations since childhood.  She has trochlear dysplasia and patella rupinder.  Past Medical History:   Diagnosis Date   • Allergic as a kid    seasonal chronic acute sinusitis - some times leads to strep   • Anxiety first noticed in middle school    coping   • Depression    • Fractures    • Headache middle school    now know triggers to avoid-unable to avoid during pd & wk bf   • Pap smear for cervical cancer screening       Past Surgical History:   Procedure Laterality Date   • WISDOM TOOTH EXTRACTION        Family History   Problem Relation Age of Onset   • Migraines Mother    • Miscarriages / Stillbirths Mother         abt 3 miscarriages bf me   • Stroke Mother         TIA - inconclusive, but potentially stress related   • Arthritis Father    • Mental illness Father         ADHD (some anxiety managed in that)   • Stroke Paternal Uncle         heavy stress & unhealthy eating clogged 1 or 2 heart valves   • Arthritis Maternal Grandmother      Social History     Socioeconomic History   • Marital status: Single     Spouse name: Not on file   • Number of children: Not on file   • Years of education: Not on file   • Highest education level: Not on file   Tobacco Use   • Smoking status: Never Smoker   • Smokeless tobacco: Never Used   Substance and Sexual Activity   • Alcohol use: Not Currently     Alcohol/week:  0.0 standard drinks     Frequency: Never     Comment: very rarely/on occasion   • Drug use: Never   • Sexual activity: Never      Current Outpatient Medications on File Prior to Visit   Medication Sig Dispense Refill   • azelastine (ASTELIN) 0.1 % nasal spray      • Desvenlafaxine  MG tablet sustained-release 24 hour Take 1 tablet by mouth Daily.     • levocetirizine (XYZAL) 5 MG tablet Take 5 mg by mouth Every Evening.     • Multiple Vitamins-Minerals (MULTIVITAMIN ADULT PO) Take 1 tablet by mouth Daily.     • naproxen (NAPROSYN) 500 MG tablet TAKE ONE TABLET BY MOUTH TWICE A DAY WITH MEALS 60 tablet 1     No current facility-administered medications on file prior to visit.       No Known Allergies     The following portions of the patient's history were reviewed and updated as appropriate: allergies, current medications, past family history, past medical history, past social history, past surgical history and problem list.    Review of Systems   Constitutional: Negative.    HENT: Negative.    Eyes: Negative.    Respiratory: Negative.    Cardiovascular: Negative.    Gastrointestinal: Negative.    Endocrine: Negative.    Genitourinary: Negative.    Musculoskeletal: Positive for arthralgias.   Skin: Negative.    Allergic/Immunologic: Negative.    Neurological: Negative.    Hematological: Negative.    Psychiatric/Behavioral: Negative.         Objective      Physical Exam  There were no vitals taken for this visit.    There is no height or weight on file to calculate BMI.    GENERAL APPEARANCE: awake, alert & oriented x 3, in no acute distress   PSYCH: normal mood and affect  LUNGS:  breathing nonlabored, no wheezing    Imaging/Studies  Imaging Results (Last 7 Days)     ** No results found for the last 168 hours. **          Assessment/Plan        ICD-10-CM ICD-9-CM   1. Patellar instability of left knee M25.362 718.86   2. Patella rupinder Q68.2 755.64     She will continue therapy exercises.  She will follow-up in  6 weeks.  If not better we will consider MPFL reconstruction.  She has a difficult problem because of the trochlear dysplasia with patella rupinder.  That she will do VMO strengthening.  She says she has a better understanding of her pathology.  Medical Decision Making  Management Options : over-the-counter medicine and physical/occupational therapy      Juanito Dai MD  04/20/20  10:21  Unable to complete visit using a video connection to the patient. A phone visit was used to complete this visits. Total time of discussion was  11 minutes.       EMR Dragon/Transcription disclaimer:  Much of this encounter note is an electronic transcription of spoken language to printed text. Electronic transcription of spoken language may permit erroneous, or at times, nonsensical words or phrases to be inadvertently transcribed. Although I have reviewed the note for such errors, some may still exist.

## 2020-05-31 DIAGNOSIS — G89.29 CHRONIC PAIN OF BOTH KNEES: ICD-10-CM

## 2020-05-31 DIAGNOSIS — M25.562 CHRONIC PAIN OF BOTH KNEES: ICD-10-CM

## 2020-05-31 DIAGNOSIS — M25.561 CHRONIC PAIN OF BOTH KNEES: ICD-10-CM

## 2020-06-01 RX ORDER — NAPROXEN 500 MG/1
TABLET ORAL
Qty: 60 TABLET | Refills: 0 | Status: SHIPPED | OUTPATIENT
Start: 2020-06-01 | End: 2020-11-25

## 2020-06-03 ENCOUNTER — OFFICE VISIT (OUTPATIENT)
Dept: ORTHOPEDIC SURGERY | Facility: CLINIC | Age: 25
End: 2020-06-03

## 2020-06-03 VITALS — HEIGHT: 68 IN | WEIGHT: 243 LBS | HEART RATE: 102 BPM | OXYGEN SATURATION: 99 % | BODY MASS INDEX: 36.83 KG/M2

## 2020-06-03 DIAGNOSIS — Q68.2 PATELLA ALTA: ICD-10-CM

## 2020-06-03 DIAGNOSIS — M25.362 PATELLAR INSTABILITY OF LEFT KNEE: Primary | ICD-10-CM

## 2020-06-03 DIAGNOSIS — M25.562 PAIN IN BOTH KNEES, UNSPECIFIED CHRONICITY: ICD-10-CM

## 2020-06-03 DIAGNOSIS — M25.561 PAIN IN BOTH KNEES, UNSPECIFIED CHRONICITY: ICD-10-CM

## 2020-06-03 PROCEDURE — 99213 OFFICE O/P EST LOW 20 MIN: CPT | Performed by: ORTHOPAEDIC SURGERY

## 2020-06-03 RX ORDER — FLUTICASONE PROPIONATE 50 MCG
SPRAY, SUSPENSION (ML) NASAL
COMMUNITY
Start: 2020-06-02 | End: 2021-08-06

## 2020-06-03 RX ORDER — OLOPATADINE HYDROCHLORIDE 1 MG/ML
SOLUTION/ DROPS OPHTHALMIC
COMMUNITY
Start: 2020-06-02 | End: 2021-08-06

## 2020-06-03 RX ORDER — CETIRIZINE HYDROCHLORIDE 10 MG/1
10 TABLET ORAL DAILY
COMMUNITY
Start: 2020-06-03 | End: 2022-03-14

## 2020-06-03 NOTE — PROGRESS NOTES
[facility]    Subjective     Chief Complaint   Patient presents with   • Follow-up     6 weeks- Patellar instability of left knee        HPI  Melania Stevens is a 25 y.o. female.  She is having worsening knee pain but she missed a lot of physical therapy because of the COVID.  Pain is 7 out of 10.  This started in 2014.  [facility]  She has tried anti-inflammatories and weight loss as well.    Past Medical History:   Diagnosis Date   • Allergic as a kid    seasonal chronic acute sinusitis - some times leads to strep   • Anxiety first noticed in middle school    coping   • Depression    • Fractures    • Headache middle school    now know triggers to avoid-unable to avoid during pd & wk bf   • Pap smear for cervical cancer screening       Past Surgical History:   Procedure Laterality Date   • WISDOM TOOTH EXTRACTION        Family History   Problem Relation Age of Onset   • Migraines Mother    • Miscarriages / Stillbirths Mother         abt 3 miscarriages bf me   • Stroke Mother         TIA - inconclusive, but potentially stress related   • Arthritis Father    • Mental illness Father         ADHD (some anxiety managed in that)   • Stroke Paternal Uncle         heavy stress & unhealthy eating clogged 1 or 2 heart valves   • Arthritis Maternal Grandmother      Social History     Socioeconomic History   • Marital status: Single     Spouse name: Not on file   • Number of children: Not on file   • Years of education: Not on file   • Highest education level: Not on file   Tobacco Use   • Smoking status: Never Smoker   • Smokeless tobacco: Never Used   Substance and Sexual Activity   • Alcohol use: Not Currently     Alcohol/week: 0.0 standard drinks     Frequency: Never     Comment: very rarely/on occasion   • Drug use: Never   • Sexual activity: Never      Current Outpatient Medications on File Prior to Visit   Medication Sig Dispense Refill   • cetirizine (zyrTEC)  "10 MG tablet      • fluticasone (FLONASE) 50 MCG/ACT nasal spray      • olopatadine (PATANOL) 0.1 % ophthalmic solution      • Desvenlafaxine  MG tablet sustained-release 24 hour Take 1 tablet by mouth Daily.     • Multiple Vitamins-Minerals (MULTIVITAMIN ADULT PO) Take 1 tablet by mouth Daily.     • naproxen (NAPROSYN) 500 MG tablet TAKE ONE TABLET BY MOUTH TWICE A DAY WITH MEALS 60 tablet 0   • [DISCONTINUED] azelastine (ASTELIN) 0.1 % nasal spray      • [DISCONTINUED] levocetirizine (XYZAL) 5 MG tablet Take 5 mg by mouth Every Evening.       No current facility-administered medications on file prior to visit.       No Known Allergies     The following portions of the patient's history were reviewed and updated as appropriate: allergies, current medications, past family history, past medical history, past social history, past surgical history and problem list.    Review of Systems   Constitutional: Negative.    HENT: Negative.    Eyes: Negative.    Respiratory: Negative.    Cardiovascular: Negative.    Gastrointestinal: Negative.    Endocrine: Negative.    Genitourinary: Negative.    Musculoskeletal: Positive for arthralgias.   Skin: Negative.    Allergic/Immunologic: Negative.    Neurological: Negative.    Hematological: Negative.    Psychiatric/Behavioral: Negative.         Objective      Physical Exam  Pulse 102   Ht 172.7 cm (67.99\")   Wt 110 kg (243 lb)   SpO2 99%   BMI 36.96 kg/m²     Body mass index is 36.96 kg/m².    GENERAL APPEARANCE: awake, alert & oriented x 3, in no acute distress and well developed, well nourished  PSYCH: normal mood and affect  LUNGS:  breathing nonlabored, no wheezing  Left knee has trace effusion positive   patella apprehension positive  Laxity of medial patellofemoral ligament with patella rupinder and otherwise no change    Imaging/Studies  Imaging Results (Last 7 Days)     ** No results found for the last 168 hours. **        Previous MRI with patella rupinder and " chondromalacia small joint effusion and no ligament tears  Assessment/Plan        ICD-10-CM ICD-9-CM   1. Patellar instability of left knee M25.362 718.86   2. Patella rupinder Q68.2 755.64   3. Pain in both knees, unspecified chronicity M25.561 719.46    M25.562        Orders Placed This Encounter   Procedures   • Ambulatory Referral to Physical Therapy      She will resume physical therapy.  I reordered that.  She will follow-up in 6 weeks.  At this point she does not want surgery but if she does not get better she is willing to consider that as a possible option.  She understands her patella rupinder and trochlear dysplasia predispose her to patella issues and make a surgery less than 100% successful  Medical Decision Making  Management Options : physical/occupational therapy      Juanito Dai MD  06/03/20  09:29         EMR Dragon/Transcription disclaimer:  Much of this encounter note is an electronic transcription of spoken language to printed text. Electronic transcription of spoken language may permit erroneous, or at times, nonsensical words or phrases to be inadvertently transcribed. Although I have reviewed the note for such errors, some may still exist.

## 2020-07-13 ENCOUNTER — OFFICE VISIT (OUTPATIENT)
Dept: INTERNAL MEDICINE | Facility: CLINIC | Age: 25
End: 2020-07-13

## 2020-07-13 ENCOUNTER — LAB (OUTPATIENT)
Dept: LAB | Facility: HOSPITAL | Age: 25
End: 2020-07-13

## 2020-07-13 VITALS
HEART RATE: 69 BPM | SYSTOLIC BLOOD PRESSURE: 110 MMHG | DIASTOLIC BLOOD PRESSURE: 84 MMHG | HEIGHT: 68 IN | TEMPERATURE: 97.5 F | WEIGHT: 234 LBS | BODY MASS INDEX: 35.46 KG/M2 | OXYGEN SATURATION: 99 % | RESPIRATION RATE: 16 BRPM

## 2020-07-13 DIAGNOSIS — R53.82 CHRONIC FATIGUE: ICD-10-CM

## 2020-07-13 DIAGNOSIS — M25.40 JOINT SWELLING: ICD-10-CM

## 2020-07-13 DIAGNOSIS — M25.50 MULTIPLE JOINT PAIN: Primary | ICD-10-CM

## 2020-07-13 DIAGNOSIS — R61 DIAPHORESIS: ICD-10-CM

## 2020-07-13 DIAGNOSIS — E66.9 OBESITY (BMI 35.0-39.9 WITHOUT COMORBIDITY): ICD-10-CM

## 2020-07-13 PROCEDURE — 99213 OFFICE O/P EST LOW 20 MIN: CPT | Performed by: NURSE PRACTITIONER

## 2020-07-13 NOTE — PROGRESS NOTES
Subjective   Melania Stevens is a 25 y.o. female.     Chief Complaint   Patient presents with   • Fatigue     ongoing issues requesting labs   • joint/body pain     knees, back, hip       Fatigue   This is a new problem. The current episode started more than 1 year ago. The problem occurs constantly. The problem has been gradually worsening. Associated symptoms include arthralgias, diaphoresis, fatigue and myalgias. Pertinent negatives include no abdominal pain, anorexia, change in bowel habit, chest pain, chills, congestion, coughing, fever, headaches, joint swelling, nausea, neck pain, numbness, rash, sore throat, swollen glands, urinary symptoms, vertigo, visual change, vomiting or weakness. Nothing aggravates the symptoms. She has tried nothing for the symptoms. The treatment provided no relief.   Pain   This is a new problem. The current episode started more than 1 year ago. The problem occurs constantly. The problem has been gradually worsening. Associated symptoms include arthralgias, diaphoresis, fatigue and myalgias. Pertinent negatives include no abdominal pain, anorexia, change in bowel habit, chest pain, chills, congestion, coughing, fever, headaches, joint swelling, nausea, neck pain, numbness, rash, sore throat, swollen glands, urinary symptoms, vertigo, visual change, vomiting or weakness. The symptoms are aggravated by standing and walking. She has tried rest and NSAIDs for the symptoms. The treatment provided mild relief.      She was followed with Dr. Dai for knee pain but switched to Dr. Roman at .  She will be having left knee surgery next week.    She has been having pain in bilateral knees, bilateral hips, and her low back.  She is convinced that this is not related to her weight or her knees.  She has a his family history of arthritis but not sure if this is rheumatoid arthritis or not.  Patient has stiffness in the morning that lasts greater than 30 minutes.  Joint pain is been getting  worse over the last several months.  She would like to have labs to fully evaluate this including autoimmune causes.      Answers for HPI/ROS submitted by the patient on 7/8/2020   What is the primary reason for your visit?: Other  Please describe your symptoms.: chronic -- fatigue, joint/bone pain & ache, trouble building muscle the past yr karo. also, pmdd & hyperhydrosis, but mainly want bloodwork to rule out some reasons for the first listed symptoms.  Have you had these symptoms before?: Yes  How long have you been having these symptoms?: Greater than 2 weeks    The following portions of the patient's history were reviewed and updated as appropriate: allergies, current medications, past family history, past medical history, past social history, past surgical history and problem list.        Review of Systems   Constitutional: Positive for diaphoresis and fatigue. Negative for chills and fever.   HENT: Negative for congestion, sore throat and swollen glands.    Respiratory: Negative for cough.    Cardiovascular: Positive for leg swelling. Negative for chest pain.   Gastrointestinal: Negative for abdominal pain, anorexia, change in bowel habit, nausea and vomiting.   Musculoskeletal: Positive for arthralgias, back pain and myalgias. Negative for joint swelling and neck pain.   Skin: Negative for rash.   Neurological: Negative for vertigo, weakness and numbness.           Outpatient Medications Marked as Taking for the 7/13/20 encounter (Office Visit) with She Beaver APRN   Medication Sig Dispense Refill   • cetirizine (zyrTEC) 10 MG tablet      • Desvenlafaxine  MG tablet sustained-release 24 hour Take 1 tablet by mouth Daily.     • fluticasone (FLONASE) 50 MCG/ACT nasal spray      • Multiple Vitamins-Minerals (MULTIVITAMIN ADULT PO) Take 1 tablet by mouth Daily.     • olopatadine (PATANOL) 0.1 % ophthalmic solution              Objective   Physical Exam   Constitutional: She is oriented to person,  "place, and time. She appears well-developed and well-nourished.   HENT:   Head: Normocephalic and atraumatic.   Eyes: Pupils are equal, round, and reactive to light. Conjunctivae are normal.   Neck: Normal range of motion. No thyroid mass and no thyromegaly present.   Cardiovascular: Normal rate, regular rhythm and normal heart sounds.   Pulmonary/Chest: Effort normal and breath sounds normal.   Abdominal: Soft. Normal appearance and bowel sounds are normal. There is no tenderness.   Musculoskeletal:        Right hip: Normal.        Left hip: Normal.        Right knee: She exhibits decreased range of motion. She exhibits no bony tenderness.        Left knee: She exhibits decreased range of motion. She exhibits no bony tenderness.        Lumbar back: She exhibits tenderness and pain.   Neurological: She is alert and oriented to person, place, and time. She has normal strength and normal reflexes. No cranial nerve deficit or sensory deficit.   Skin: Skin is warm and dry.   Psychiatric: She has a normal mood and affect. Her behavior is normal. Judgment and thought content normal.   Nursing note and vitals reviewed.      Vitals:    07/13/20 0902   BP: 110/84   Pulse: 69   Resp: 16   Temp: 97.5 °F (36.4 °C)   SpO2: 99%   Weight: 106 kg (234 lb)   Height: 172.7 cm (68\")   PainSc:   8   PainLoc: Knee     Body mass index is 35.58 kg/m².        Assessment/Plan       Diagnoses and all orders for this visit:    1. Multiple joint pain (Primary)  -     CBC (No Diff); Future  -     Comprehensive Metabolic Panel; Future  -     TSH; Future  -     T4, free; Future  -     Ferritin; Future  -     Iron Profile; Future  -     PITA With / DsDNA, RNP, Sjogrens A / B, Harmon; Future  -     Rheumatoid Factor; Future  -     Rheumatoid Factor  -     PITA With / DsDNA, RNP, Sjogrens A / B, Harmon  -     Iron Profile  -     T4, free  -     Ferritin  -     TSH  -     Comprehensive Metabolic Panel  -     CBC (No Diff)    2. Chronic fatigue  -     CBC " (No Diff); Future  -     Comprehensive Metabolic Panel; Future  -     TSH; Future  -     T4, free; Future  -     Ferritin; Future  -     Iron Profile; Future  -     PITA With / DsDNA, RNP, Sjogrens A / B, Harmon; Future  -     Rheumatoid Factor; Future  -     Rheumatoid Factor  -     PITA With / DsDNA, RNP, Sjogrens A / B, Harmon  -     Iron Profile  -     T4, free  -     Ferritin  -     TSH  -     Comprehensive Metabolic Panel  -     CBC (No Diff)    3. Joint swelling  -     CBC (No Diff); Future  -     Comprehensive Metabolic Panel; Future  -     TSH; Future  -     T4, free; Future  -     Ferritin; Future  -     Iron Profile; Future  -     PITA With / DsDNA, RNP, Sjogrens A / B, Harmon; Future  -     Rheumatoid Factor; Future  -     Rheumatoid Factor  -     PITA With / DsDNA, RNP, Sjogrens A / B, Harmon  -     Iron Profile  -     T4, free  -     Ferritin  -     TSH  -     Comprehensive Metabolic Panel  -     CBC (No Diff)    4. Diaphoresis  -     CBC (No Diff); Future  -     Comprehensive Metabolic Panel; Future  -     TSH; Future  -     T4, free; Future  -     Ferritin; Future  -     Iron Profile; Future  -     PITA With / DsDNA, RNP, Sjogrens A / B, Harmon; Future  -     Rheumatoid Factor; Future  -     Rheumatoid Factor  -     PITA With / DsDNA, RNP, Sjogrens A / B, Harmon  -     Iron Profile  -     T4, free  -     Ferritin  -     TSH  -     Comprehensive Metabolic Panel  -     CBC (No Diff)    5. Obesity (BMI 35.0-39.9 without comorbidity)      She appears well and in no acute distress in office.  Labs ordered as above to evaluate ongoing complaints.  Suspect her chronic knee issues are contributing to her hip and back pain as well as her fatigue.  Encouraged healthy diet and routine physical activity as tolerated/able.  She is scheduled for left knee surgery next week.    Return for will call with results, Labs today.    I discussed my findings,recommendations, and plan of care was with the patient. They verbalized  understanding and agreement.  Patient was encouraged to keep me informed of any acute changes, lack of improvement, or any new concerning symptoms.       * Please note that portions of this note were completed with a voice recognition program. Efforts were made to edit the dictation but occasionally words are erroneously transcribed.

## 2020-07-14 LAB
ALBUMIN SERPL-MCNC: 4.4 G/DL (ref 3.9–5)
ALBUMIN/GLOB SERPL: 1.9 {RATIO} (ref 1.2–2.2)
ALP SERPL-CCNC: 54 IU/L (ref 39–117)
ALT SERPL-CCNC: 23 IU/L (ref 0–32)
ANA SER QL: NEGATIVE
AST SERPL-CCNC: 23 IU/L (ref 0–40)
BILIRUB SERPL-MCNC: 0.3 MG/DL (ref 0–1.2)
BUN SERPL-MCNC: 8 MG/DL (ref 6–20)
BUN/CREAT SERPL: 9 (ref 9–23)
CALCIUM SERPL-MCNC: 9.3 MG/DL (ref 8.7–10.2)
CHLORIDE SERPL-SCNC: 103 MMOL/L (ref 96–106)
CO2 SERPL-SCNC: 25 MMOL/L (ref 20–29)
CREAT SERPL-MCNC: 0.85 MG/DL (ref 0.57–1)
ERYTHROCYTE [DISTWIDTH] IN BLOOD BY AUTOMATED COUNT: 12.2 % (ref 11.7–15.4)
FERRITIN SERPL-MCNC: 13 NG/ML (ref 15–150)
GLOBULIN SER CALC-MCNC: 2.3 G/DL (ref 1.5–4.5)
GLUCOSE SERPL-MCNC: 97 MG/DL (ref 65–99)
HCT VFR BLD AUTO: 41.1 % (ref 34–46.6)
HGB BLD-MCNC: 13.7 G/DL (ref 11.1–15.9)
IRON SATN MFR SERPL: 11 % (ref 15–55)
IRON SERPL-MCNC: 38 UG/DL (ref 27–159)
MCH RBC QN AUTO: 28.8 PG (ref 26.6–33)
MCHC RBC AUTO-ENTMCNC: 33.3 G/DL (ref 31.5–35.7)
MCV RBC AUTO: 86 FL (ref 79–97)
PLATELET # BLD AUTO: 229 X10E3/UL (ref 150–450)
POTASSIUM SERPL-SCNC: 4.7 MMOL/L (ref 3.5–5.2)
PROT SERPL-MCNC: 6.7 G/DL (ref 6–8.5)
RBC # BLD AUTO: 4.76 X10E6/UL (ref 3.77–5.28)
SODIUM SERPL-SCNC: 140 MMOL/L (ref 134–144)
T4 FREE SERPL-MCNC: 1.17 NG/DL (ref 0.82–1.77)
TIBC SERPL-MCNC: 356 UG/DL (ref 250–450)
TSH SERPL DL<=0.005 MIU/L-ACNC: 2.8 UIU/ML (ref 0.45–4.5)
UIBC SERPL-MCNC: 318 UG/DL (ref 131–425)
WBC # BLD AUTO: 5 X10E3/UL (ref 3.4–10.8)

## 2020-07-17 DIAGNOSIS — E61.1 IRON DEFICIENCY: Primary | ICD-10-CM

## 2020-07-17 RX ORDER — DOXYCYCLINE HYCLATE 50 MG/1
324 CAPSULE, GELATIN COATED ORAL
Qty: 90 TABLET | Refills: 2 | Status: SHIPPED | OUTPATIENT
Start: 2020-07-17 | End: 2020-11-25

## 2020-07-29 ENCOUNTER — TELEPHONE (OUTPATIENT)
Dept: INTERNAL MEDICINE | Facility: CLINIC | Age: 25
End: 2020-07-29

## 2020-07-29 NOTE — TELEPHONE ENCOUNTER
Caller: Melania Stevens    Relationship: Self    Best call back number: [phone]    Caller requesting test results: SELF    What test was performed: LABS    When was the test performed: 07/13-20    Where was the test performed:     Additional notes: Patient asked why she was put on iron supplements.

## 2020-09-24 ENCOUNTER — TELEPHONE (OUTPATIENT)
Dept: INTERNAL MEDICINE | Facility: CLINIC | Age: 25
End: 2020-09-24

## 2020-09-24 NOTE — TELEPHONE ENCOUNTER
Patient called and stated that she would like to have lab orders placed for her. The patient states she has had already had her physical on 03/17/2020 and a follow up appointment on 07/13/2020. The patient would like lab orders placed for the following labs;     Jannie IR or fasting glucose and insulin   A1C  Iron   Lipid Panel   Ferritin  Vit D   Vit C   Magnesium   Zinc   Vit K1     Please advise.     Patient call back [phone]

## 2020-09-25 NOTE — TELEPHONE ENCOUNTER
Attempted to contact patient at [phone], no VM box has been set up yet, unable to LVM    HUB if patient returns our call please advise her that she will need an appointment in order to have lab orders placed and help her schedule one with She Beaver. Thank you!

## 2020-09-28 ENCOUNTER — LAB REQUISITION (OUTPATIENT)
Dept: LAB | Facility: HOSPITAL | Age: 25
End: 2020-09-28

## 2020-09-28 ENCOUNTER — OFFICE VISIT (OUTPATIENT)
Dept: INTERNAL MEDICINE | Facility: CLINIC | Age: 25
End: 2020-09-28

## 2020-09-28 VITALS
WEIGHT: 233 LBS | TEMPERATURE: 97.5 F | HEIGHT: 68 IN | RESPIRATION RATE: 18 BRPM | BODY MASS INDEX: 35.31 KG/M2 | DIASTOLIC BLOOD PRESSURE: 80 MMHG | SYSTOLIC BLOOD PRESSURE: 110 MMHG | OXYGEN SATURATION: 99 % | HEART RATE: 77 BPM

## 2020-09-28 DIAGNOSIS — M25.50 MULTIPLE JOINT PAIN: ICD-10-CM

## 2020-09-28 DIAGNOSIS — E78.00 ELEVATED CHOLESTEROL: ICD-10-CM

## 2020-09-28 DIAGNOSIS — M25.40 JOINT SWELLING: ICD-10-CM

## 2020-09-28 DIAGNOSIS — R53.82 CHRONIC FATIGUE: ICD-10-CM

## 2020-09-28 DIAGNOSIS — R63.1 POLYDIPSIA: ICD-10-CM

## 2020-09-28 DIAGNOSIS — E61.1 IRON DEFICIENCY: Primary | ICD-10-CM

## 2020-09-28 DIAGNOSIS — Z00.00 ROUTINE GENERAL MEDICAL EXAMINATION AT A HEALTH CARE FACILITY: ICD-10-CM

## 2020-09-28 PROCEDURE — 36415 COLL VENOUS BLD VENIPUNCTURE: CPT | Performed by: NURSE PRACTITIONER

## 2020-09-28 PROCEDURE — 99214 OFFICE O/P EST MOD 30 MIN: CPT | Performed by: NURSE PRACTITIONER

## 2020-09-28 NOTE — PROGRESS NOTES
"Subjective   Melania Stevens is a 25 y.o. female.     Chief Complaint   Patient presents with   • Fatigue     suspect insulin resistence   • .joint pain     with inflamation       History of Present Illness     Patient is here to request labs.  She has had ongoing fatigue and joint pain for greater than 1 year.  At her last visit we checked a rheumatoid factor (this was not completed at the lab level), PITA which was negative, iron studies which revealed iron deficiency anemia and she has been taking iron supplementation since, TSH was normal, CMP was normal, CBC was normal.  She had lipids checked in 3/2020 which showed total cholesterol 204, triglycerides 159, HDL 59, and .  She reports that she has been doing more paly O diet recently and wants to recheck her cholesterol to see if it is helping.  She reports she has been doing some research and she feels as though she has insulin resistance.  She wants to have insulin levels, A1c, lipid, iron studies, vitamin K, magnesium, vitamin C, and various other labs checked today.    She has no personal or familial history of diabetes.  She has difficulty describing her symptoms.  She admits to being less active and having a slightly decreased appetite.  She is fatigued, she is heat intolerant, has some polydipsia, has chronic heavy menses, and intermittent joint swelling, arthralgias, and myalgias.  These joint complaints are mainly in the knees, hips, and low back.  She did have knee surgery on her left knee since her last visit and has been recovering well while working with physical therapy.     When she eats foods that are high in carbs or sugars she \"crashes\" for a bit.    She has difficulty losing weight.  Tries to stay physically active.      The following portions of the patient's history were reviewed and updated as appropriate: allergies, current medications, past family history, past medical history, past social history, past surgical history and problem " list.        Review of Systems   Constitutional: Positive for activity change (less active recently ), appetite change (decreased appetite) and fatigue. Negative for chills, diaphoresis, fever, unexpected weight gain and unexpected weight loss.   HENT: Negative.    Eyes: Negative for blurred vision, double vision and visual disturbance.   Respiratory: Negative for cough, chest tightness and shortness of breath.    Cardiovascular: Negative for chest pain, palpitations and leg swelling.   Gastrointestinal: Negative for abdominal pain, constipation, diarrhea, nausea and vomiting.   Endocrine: Positive for heat intolerance and polydipsia. Negative for cold intolerance, polyphagia and polyuria.   Genitourinary: Positive for menstrual problem (heavy since onset). Negative for amenorrhea, difficulty urinating, flank pain, frequency, vaginal bleeding and vaginal discharge.   Musculoskeletal: Positive for arthralgias, joint swelling and myalgias.   Skin: Negative for dry skin and rash.   Neurological: Positive for headache. Negative for dizziness, syncope, weakness, light-headedness and numbness.   Hematological: Negative for adenopathy. Bruises/bleeds easily.   Psychiatric/Behavioral: Positive for decreased concentration, depressed mood and stress. Negative for sleep disturbance. The patient is nervous/anxious.            Outpatient Medications Marked as Taking for the 9/28/20 encounter (Office Visit) with She Beaver APRN   Medication Sig Dispense Refill   • cetirizine (zyrTEC) 10 MG tablet      • Desvenlafaxine  MG tablet sustained-release 24 hour Take 1 tablet by mouth Daily.     • ferrous gluconate (FERGON) 324 MG tablet Take 1 tablet by mouth 3 (Three) Times a Day With Meals. 90 tablet 2   • fluticasone (FLONASE) 50 MCG/ACT nasal spray      • Multiple Vitamins-Minerals (MULTIVITAMIN ADULT PO) Take 1 tablet by mouth Daily.     • olopatadine (PATANOL) 0.1 % ophthalmic solution        No Known  "Allergies        Objective   Physical Exam  Vitals signs reviewed.   Constitutional:       Appearance: Normal appearance. She is well-developed. She is obese.   HENT:      Head: Normocephalic and atraumatic.      Right Ear: External ear normal.      Left Ear: External ear normal.   Eyes:      General: Lids are normal. Gaze aligned appropriately. No allergic shiner.     Extraocular Movements: Extraocular movements intact.      Conjunctiva/sclera: Conjunctivae normal.      Pupils: Pupils are equal, round, and reactive to light.   Neck:      Musculoskeletal: Normal range of motion.      Thyroid: No thyroid mass, thyromegaly or thyroid tenderness.   Cardiovascular:      Rate and Rhythm: Normal rate and regular rhythm.      Chest Wall: PMI is not displaced. No thrill.      Pulses: No decreased pulses.      Heart sounds: Normal heart sounds.   Pulmonary:      Effort: Pulmonary effort is normal.      Breath sounds: Normal breath sounds.   Abdominal:      General: Bowel sounds are normal.      Palpations: Abdomen is soft.      Tenderness: There is no abdominal tenderness.   Musculoskeletal: Normal range of motion.   Skin:     General: Skin is warm and dry.          Neurological:      Mental Status: She is alert and oriented to person, place, and time.   Psychiatric:         Behavior: Behavior normal. Behavior is cooperative.         Thought Content: Thought content normal.         Judgment: Judgment normal.         Vitals:    09/28/20 1334   BP: 110/80   Pulse: 77   Resp: 18   Temp: 97.5 °F (36.4 °C)   SpO2: 99%   Weight: 106 kg (233 lb)   Height: 172.7 cm (68\")     Body mass index is 35.43 kg/m².  Wt Readings from Last 3 Encounters:   09/28/20 106 kg (233 lb)   07/13/20 106 kg (234 lb)   06/03/20 110 kg (243 lb)             Assessment/Plan       Diagnoses and all orders for this visit:    1. Iron deficiency (Primary)  -     CBC (No Diff); Future  -     Iron Profile; Future  -     Ferritin; Future  -     Lipid Panel; " Future  -     Hemoglobin A1c; Future  -     Basic Metabolic Panel; Future  -     Celiac Panel Reflex To Titer; Future  -     Cancel: Rheumatoid Factor; Future  -     Rheumatoid Factor, Quant; Future  -     Celiac Panel Reflex To Titer  -     Rheumatoid Factor, Quant  -     Basic Metabolic Panel  -     Hemoglobin A1c  -     Lipid Panel  -     Ferritin  -     Iron Profile  -     CBC (No Diff)    2. Multiple joint pain  -     CBC (No Diff); Future  -     Iron Profile; Future  -     Ferritin; Future  -     Lipid Panel; Future  -     Hemoglobin A1c; Future  -     Basic Metabolic Panel; Future  -     Celiac Panel Reflex To Titer; Future  -     Cancel: Rheumatoid Factor; Future  -     Rheumatoid Factor, Quant; Future  -     Celiac Panel Reflex To Titer  -     Rheumatoid Factor, Quant  -     Basic Metabolic Panel  -     Hemoglobin A1c  -     Lipid Panel  -     Ferritin  -     Iron Profile  -     CBC (No Diff)    3. Chronic fatigue  -     CBC (No Diff); Future  -     Iron Profile; Future  -     Ferritin; Future  -     Lipid Panel; Future  -     Hemoglobin A1c; Future  -     Basic Metabolic Panel; Future  -     Celiac Panel Reflex To Titer; Future  -     Cancel: Rheumatoid Factor; Future  -     Rheumatoid Factor, Quant; Future  -     Celiac Panel Reflex To Titer  -     Rheumatoid Factor, Quant  -     Basic Metabolic Panel  -     Hemoglobin A1c  -     Lipid Panel  -     Ferritin  -     Iron Profile  -     CBC (No Diff)    4. Joint swelling  -     CBC (No Diff); Future  -     Iron Profile; Future  -     Ferritin; Future  -     Lipid Panel; Future  -     Hemoglobin A1c; Future  -     Basic Metabolic Panel; Future  -     Celiac Panel Reflex To Titer; Future  -     Cancel: Rheumatoid Factor; Future  -     Rheumatoid Factor, Quant; Future  -     Celiac Panel Reflex To Titer  -     Rheumatoid Factor, Quant  -     Basic Metabolic Panel  -     Hemoglobin A1c  -     Lipid Panel  -     Ferritin  -     Iron Profile  -     CBC (No  Diff)    5. Polydipsia  -     Hemoglobin A1c    6. Elevated cholesterol   -     Lipid Panel; Future      I discussed the patient's extensive lab requests, symptoms, and exam with Dr. Dana Ortiz.  I discussed the various lab request with the patient and symptoms and personal/familial history do not support those labs.  Patient verbalizes understanding.  We will check a celiac due to the vague fatigue and joint pain.  I have discussed this with the patient and she agrees to proceed.  We will check an A1c to rule out diabetes.  We will recheck her iron deficiency labs as well.  Continue iron supplementation for now  She will also try nightshade free diet as there could be some degree of nightshade arthropathy.  She should try this for 2 weeks and if ineffective can return to consuming nightshades and at that point we would have her try a 100% gluten-free diet to see if that helps with her symptoms.  Recheck lipids at her request.  I would not think that she would need cholesterol medications at this time but should rather just continue healthy diet and routine physical activity.    Return in about 2 months (around 11/28/2020) for Labs today.    I discussed my findings,recommendations, and plan of care was with the patient. They verbalized understanding and agreement.  Patient was encouraged to keep me informed of any acute changes, lack of improvement, or any new concerning symptoms.       * Please note that portions of this note were completed with a voice recognition program. Efforts were made to edit the dictation but occasionally words are erroneously transcribed.

## 2020-09-29 LAB
BUN SERPL-MCNC: 9 MG/DL (ref 6–20)
BUN/CREAT SERPL: 12 (ref 9–23)
CALCIUM SERPL-MCNC: 9.5 MG/DL (ref 8.7–10.2)
CHLORIDE SERPL-SCNC: 102 MMOL/L (ref 96–106)
CHOLEST SERPL-MCNC: 231 MG/DL (ref 100–199)
CO2 SERPL-SCNC: 23 MMOL/L (ref 20–29)
CREAT SERPL-MCNC: 0.78 MG/DL (ref 0.57–1)
ERYTHROCYTE [DISTWIDTH] IN BLOOD BY AUTOMATED COUNT: 13.9 % (ref 11.7–15.4)
FERRITIN SERPL-MCNC: 41 NG/ML (ref 15–150)
GLIADIN PEPTIDE IGA SER-ACNC: 4 UNITS (ref 0–19)
GLUCOSE SERPL-MCNC: 89 MG/DL (ref 65–99)
HBA1C MFR BLD: 5 % (ref 4.8–5.6)
HCT VFR BLD AUTO: 45.1 % (ref 34–46.6)
HDLC SERPL-MCNC: 63 MG/DL
HGB BLD-MCNC: 15.1 G/DL (ref 11.1–15.9)
IGA SERPL-MCNC: 93 MG/DL (ref 87–352)
IRON SATN MFR SERPL: 18 % (ref 15–55)
IRON SERPL-MCNC: 61 UG/DL (ref 27–159)
LDLC SERPL CALC-MCNC: 154 MG/DL (ref 0–99)
MCH RBC QN AUTO: 28.6 PG (ref 26.6–33)
MCHC RBC AUTO-ENTMCNC: 33.5 G/DL (ref 31.5–35.7)
MCV RBC AUTO: 85 FL (ref 79–97)
PLATELET # BLD AUTO: 258 X10E3/UL (ref 150–450)
POTASSIUM SERPL-SCNC: 4.7 MMOL/L (ref 3.5–5.2)
RBC # BLD AUTO: 5.28 X10E6/UL (ref 3.77–5.28)
RHEUMATOID FACT SERPL-ACNC: <10 IU/ML (ref 0–13.9)
SODIUM SERPL-SCNC: 141 MMOL/L (ref 134–144)
TIBC SERPL-MCNC: 334 UG/DL (ref 250–450)
TRIGL SERPL-MCNC: 80 MG/DL (ref 0–149)
TTG IGA SER-ACNC: <2 U/ML (ref 0–3)
UIBC SERPL-MCNC: 273 UG/DL (ref 131–425)
VLDLC SERPL CALC-MCNC: 14 MG/DL (ref 5–40)
WBC # BLD AUTO: 5 X10E3/UL (ref 3.4–10.8)

## 2020-11-16 ENCOUNTER — TELEPHONE (OUTPATIENT)
Dept: INTERNAL MEDICINE | Facility: CLINIC | Age: 25
End: 2020-11-16

## 2020-11-16 NOTE — TELEPHONE ENCOUNTER
PATIENT CALLED BACK AND STATED THAT A PRIOR AUTHORIZATION MAY BE REQUIRED STATING IT IS MEDICALLY NECESSARY FOR HER MEDICATION PER HER INSURANCE COMPANY      PATIENT WANTED TO KNOW IF DR. LUISA HERRERA WOULD BE ABLE TO CONTACT HER PSYCHIATRIST, DR. ZULEMA DOMINGUEZ FOR INFO. HER PSYCHIATRIST STATED HE WOULD ASSIST ANYWAY HE COULD TO GET THIS APPROVED FOR THE PATIENT

## 2020-11-16 NOTE — TELEPHONE ENCOUNTER
Before I will agree to write:  1. She will need to schedule an appointment with me so we can discuss   2.her psychiatrist must contact me/send notes. The patient will likely have to sign an ROSALIO with them to send me the records.

## 2020-11-16 NOTE — TELEPHONE ENCOUNTER
Caller: Melania Stevens    Relationship: Self    Best call back number: [phone]     What medication are you requesting:Vilazodone hci 40 mg (VIIBYRD)    What are your current symptoms: THE PATIENT STATES HER PSYCHIATRIST, DR. ZULEMA DOMINGUEZ,  PRESCRIBED THIS MEDICATION BUT HE IS OUT OF NETWORK AND THE PRESCRIPTION COST FROM AN OUT OF NETWORK PROVIDER IS $304 PER MONTH. THE PATIENT STATES SHE DOES NOT WANT TO SWITCH PSYCHIATRISTS AND IS REQUESTING THAT LUISA HERRERA PLEASE FILL PRESCRIPTION.  THE PATIENT STATES THAT DR DOMINGUEZ CAN PROVIDE INFORMATION ON THE PATIENT IF LUISA HERRERA NEEDS THIS.   How long have you been experiencing symptoms: ANXIETY/DEPRESSION  Have you had these symptoms before:    [x] Yes  [] No    Have you been treated for these symptoms before:   [x] Yes  [] No    If a prescription is needed, what is your preferred pharmacy and phone number: [facility] [address] [facility] - [phone] [facility] [phone] [facility]     Additional notes:    PLEASE CALL PATIENT IF ANY QUESTIONS -[phone]

## 2020-11-17 NOTE — TELEPHONE ENCOUNTER
PN.  Appointment scheduled for next week. She will stop by psych office tomorrow to sign ROSALIO and have them send record

## 2020-11-25 ENCOUNTER — TELEMEDICINE (OUTPATIENT)
Dept: INTERNAL MEDICINE | Facility: CLINIC | Age: 25
End: 2020-11-25

## 2020-11-25 DIAGNOSIS — F33.0 MILD EPISODE OF RECURRENT MAJOR DEPRESSIVE DISORDER (HCC): Primary | ICD-10-CM

## 2020-11-25 PROCEDURE — 99213 OFFICE O/P EST LOW 20 MIN: CPT | Performed by: NURSE PRACTITIONER

## 2020-11-25 RX ORDER — VILAZODONE HYDROCHLORIDE 40 MG/1
40 TABLET ORAL DAILY
Qty: 30 TABLET | Refills: 1 | Status: SHIPPED | OUTPATIENT
Start: 2020-11-25 | End: 2021-01-13 | Stop reason: SDUPTHER

## 2020-11-25 RX ORDER — VILAZODONE HYDROCHLORIDE 40 MG/1
40 TABLET ORAL DAILY
COMMUNITY
Start: 2020-10-13 | End: 2020-11-25 | Stop reason: SDUPTHER

## 2020-11-25 NOTE — PROGRESS NOTES
You have chosen to receive care through a telehealth visit.  Do you consent to use a video/audio connection for your medical care today? Yes  This was an audio and video enabled telemedicine encounter.    Subjective   Melania Stevens is a 25 y.o. female.     Chief Complaint   Patient presents with   • Depression       History of Present Illness   Rashard is being seen today via telehealth visit for ongoing depression.  She is followed by psychiatry, Dr. Sung Bang who has started her on Viibryd.  She is doing pretty well with this but is very concerned that her psychiatrist is now out of network and her medication will not be covered because of this.  She and her psychiatrist have discussed the possibility of me prescribing her Librium while she only has to worry about the out-of-pocket cost for her visit to him.  Psych is out of network. She has had several psychiatrists in the past and really likels the one she is going to now. Needs PCP to prescribe meds so they may be covered by er insurance.  She reports that she has asked her psychiatrist to send me records and has signed a release of information there.  I have not received any records as of yet.  She denies any suicidal or homicidal ideations.  She does still have some anhedonia, fatigue, and difficulty with concentration.  PHQ-9 Depression Screening  Little interest or pleasure in doing things? 1   Feeling down, depressed, or hopeless? 0   Trouble falling or staying asleep, or sleeping too much? 0   Feeling tired or having little energy? 1   Poor appetite or overeating? 0   Feeling bad about yourself - or that you are a failure or have let yourself or your family down? 1   Trouble concentrating on things, such as reading the newspaper or watching television? 3   Moving or speaking so slowly that other people could have noticed? Or the opposite - being so fidgety or restless that you have been moving around a lot more than usual? 1   Thoughts that you  would be better off dead, or of hurting yourself in some way? 0   PHQ-9 Total Score 7   If you checked off any problems, how difficult have these problems made it for you to do your work, take care of things at home, or get along with other people? Somewhat difficult         The following portions of the patient's history were reviewed and updated as appropriate: allergies, current medications, past family history, past medical history, past social history, past surgical history and problem list.        Review of Systems   Constitutional: Negative for fatigue, fever and unexpected weight loss.   Eyes: Negative for blurred vision, double vision and visual disturbance.   Respiratory: Negative for cough, shortness of breath and wheezing.    Cardiovascular: Negative for chest pain, palpitations and leg swelling.   Gastrointestinal: Negative for abdominal pain, constipation, diarrhea, nausea and vomiting.   Genitourinary: Negative for difficulty urinating, frequency and urgency.   Musculoskeletal: Negative for arthralgias and myalgias.   Skin: Negative for color change and rash.   Neurological: Negative for dizziness, weakness and headache.   Hematological: Negative for adenopathy. Does not bruise/bleed easily.   Psychiatric/Behavioral: Positive for decreased concentration, sleep disturbance, depressed mood and stress. Negative for self-injury and suicidal ideas. The patient is nervous/anxious.            Outpatient Medications Marked as Taking for the 11/25/20 encounter (Telemedicine) with She Beaver APRN   Medication Sig Dispense Refill   • cetirizine (zyrTEC) 10 MG tablet      • fluticasone (FLONASE) 50 MCG/ACT nasal spray      • Multiple Vitamins-Minerals (MULTIVITAMIN ADULT PO) Take 1 tablet by mouth Daily.     • olopatadine (PATANOL) 0.1 % ophthalmic solution      • vilazodone (Viibryd) 40 MG tablet tablet Take 1 tablet by mouth Daily. 30 tablet 1   • [DISCONTINUED] vilazodone (Viibryd) 40 MG tablet tablet  Take 40 mg by mouth Daily.       No Known Allergies        Objective   Physical Exam   Constitutional: She appears well-developed and well-nourished.   HENT:   Head: Normocephalic and atraumatic.   Eyes: EOM are normal. No scleral icterus.   Neck: Neck normal appearance.  Cardiovascular:   No conjunctival pallor noted.    Pulmonary/Chest: Effort normal.  No respiratory distress.  Abdominal: Abdomen appears normal.   Neurological: She is alert.   Skin: Skin is dry. No nail bed cyanosis or erythema. No pallor.   Psychiatric: She has a normal mood and affect. She mood appears normal. Her affect is normal. Her behavior is normal. Thought content is normal. She does not express abnormal judgement.         There were no vitals filed for this visit.  There is no height or weight on file to calculate BMI.        Assessment/Plan   Diagnoses and all orders for this visit:    1. Mild episode of recurrent major depressive disorder (CMS/HCC) (Primary)  -     vilazodone (Viibryd) 40 MG tablet tablet; Take 1 tablet by mouth Daily.  Dispense: 30 tablet; Refill: 1      She will contnue to see psych.  I will go ahead and send in the Viibryd at the current dose.  If she is requiring multiple medications or frequent adjustments then I will have to ultimately defer back to psych but I am glad to help helped in the interim with her Viibryd prescription.  She will call her psychiatrist and ask them again to forward her records to me.         Return in about 6 weeks (around 1/6/2021).    I discussed my findings,recommendations, and plan of care was with the patient. They verbalized understanding and agreement.  Patient was encouraged to keep me informed of any acute changes, lack of improvement, or any new concerning symptoms.       * Please note that portions of this note were completed with a voice recognition program. Efforts were made to edit the dictation but occasionally words are erroneously transcribed.

## 2020-12-14 DIAGNOSIS — F33.0 MILD EPISODE OF RECURRENT MAJOR DEPRESSIVE DISORDER (HCC): ICD-10-CM

## 2020-12-14 RX ORDER — VILAZODONE HYDROCHLORIDE 40 MG/1
40 TABLET ORAL DAILY
Qty: 30 TABLET | Refills: 1 | OUTPATIENT
Start: 2020-12-14

## 2020-12-14 NOTE — TELEPHONE ENCOUNTER
PN that script was sent in on 11/25.  We have not heard anything from them so ins may have covered it

## 2020-12-14 NOTE — TELEPHONE ENCOUNTER
Caller: Melania Stevens    Relationship: Self    Best call back number: [phone] (H)    Medication needed:   Requested Prescriptions     Pending Prescriptions Disp Refills   • vilazodone (Viibryd) 40 MG tablet tablet 30 tablet 1     Sig: Take 1 tablet by mouth Daily.       When do you need the refill by: 12/21/2020    What details did the patient provide when requesting the medication: PATIENT CALLING TO CHECK THE STATUS OF HER MEDICATION. WANTED TO KNOW IF INSURANCE HAS COVERED THIS MEDICATION YET.     Does the patient have less than a 3 day supply:  [] Yes  [x] No    What is the patient's preferred pharmacy: [facility] [phone] [facility] [phone] [facility]

## 2020-12-16 ENCOUNTER — TELEPHONE (OUTPATIENT)
Dept: INTERNAL MEDICINE | Facility: CLINIC | Age: 25
End: 2020-12-16

## 2021-01-13 ENCOUNTER — OFFICE VISIT (OUTPATIENT)
Dept: INTERNAL MEDICINE | Facility: CLINIC | Age: 26
End: 2021-01-13

## 2021-01-13 ENCOUNTER — TELEPHONE (OUTPATIENT)
Dept: INTERNAL MEDICINE | Facility: CLINIC | Age: 26
End: 2021-01-13

## 2021-01-13 VITALS
TEMPERATURE: 97.5 F | SYSTOLIC BLOOD PRESSURE: 110 MMHG | OXYGEN SATURATION: 99 % | HEIGHT: 68 IN | BODY MASS INDEX: 30.46 KG/M2 | DIASTOLIC BLOOD PRESSURE: 64 MMHG | HEART RATE: 83 BPM | RESPIRATION RATE: 18 BRPM | WEIGHT: 201 LBS

## 2021-01-13 DIAGNOSIS — B00.1 FEVER BLISTER: Chronic | ICD-10-CM

## 2021-01-13 DIAGNOSIS — M94.0 COSTOCHONDRITIS: Primary | ICD-10-CM

## 2021-01-13 DIAGNOSIS — F33.0 MILD EPISODE OF RECURRENT MAJOR DEPRESSIVE DISORDER (HCC): Chronic | ICD-10-CM

## 2021-01-13 PROCEDURE — 99214 OFFICE O/P EST MOD 30 MIN: CPT | Performed by: NURSE PRACTITIONER

## 2021-01-13 RX ORDER — NAPROXEN 500 MG/1
500 TABLET ORAL 2 TIMES DAILY PRN
Qty: 30 TABLET | Refills: 0 | Status: SHIPPED | OUTPATIENT
Start: 2021-01-13 | End: 2021-09-08 | Stop reason: SDUPTHER

## 2021-01-13 RX ORDER — VILAZODONE HYDROCHLORIDE 40 MG/1
40 TABLET ORAL DAILY
Qty: 30 TABLET | Refills: 2 | Status: SHIPPED | OUTPATIENT
Start: 2021-01-13 | End: 2021-03-22 | Stop reason: SDUPTHER

## 2021-01-13 RX ORDER — ACYCLOVIR 400 MG/1
TABLET ORAL
Qty: 30 TABLET | Refills: 0 | Status: SHIPPED | OUTPATIENT
Start: 2021-01-13 | End: 2022-03-14

## 2021-01-13 RX ORDER — ACYCLOVIR 400 MG/1
TABLET ORAL
Qty: 30 TABLET | Refills: 0 | Status: SHIPPED | OUTPATIENT
Start: 2021-01-13 | End: 2021-01-13 | Stop reason: SDUPTHER

## 2021-01-13 NOTE — PROGRESS NOTES
"Chief Complaint  costochondritis ( Chest pain-  follow up), Depression, and Fever blister    Subjective          Melania Stevens presents to [facility] for costochondritis/chest pain      Chest Pain   This is a new problem. The current episode started 1 to 4 weeks ago. The onset quality is sudden. The problem occurs every several days. The problem has been gradually improving. The pain is present in the substernal region. The pain is mild. Quality: aching. The pain does not radiate. Pertinent negatives include no abdominal pain, back pain, claudication, cough, diaphoresis, dizziness, exertional chest pressure, fever, irregular heartbeat, leg pain, lower extremity edema, near-syncope, orthopnea, palpitations, PND, shortness of breath, vomiting or weakness. Associated with: palpation. She has tried rest for the symptoms. The treatment provided significant relief.   Was seen at  12/24/2020 for chest pain.  She was diagnosed with costochondritis. She was prescribed prednisone but she did not take it. She has had intermittent pain since then and feels like she is getting better slowly with out.     She has depression.  She is currently on Viibryd.  Reports symptoms are well controlled at her current dose.  She does follow with psychiatry (Dr. Sung Bang ).  I prescribed her Viibryd for her due to insurance constraints.    Recurrent fever blisters-she reports that she has had more of these over the last several weeks as she has had an increase in stress.  And has historically used acyclovir for outbreaks.  She would like to have a refill of this.        Objective   Vital Signs:   /64   Pulse 83   Temp 97.5 °F (36.4 °C)   Resp 18   Ht 172.7 cm (68\")   Wt 91.2 kg (201 lb)   SpO2 99%   BMI 30.56 kg/m²     Physical Exam  Constitutional:       Appearance: Normal appearance. She is well-developed.   HENT:      Head: Normocephalic and atraumatic.     Eyes:      " Conjunctiva/sclera: Conjunctivae normal.      Pupils: Pupils are equal, round, and reactive to light.   Neck:      Musculoskeletal: Normal range of motion.   Cardiovascular:      Rate and Rhythm: Normal rate and regular rhythm.      Heart sounds: Normal heart sounds.   Pulmonary:      Effort: Pulmonary effort is normal.      Breath sounds: Normal breath sounds.   Chest:      Chest wall: No mass, lacerations, deformity, swelling, tenderness, crepitus or edema. There is no dullness to percussion.   Abdominal:      General: Bowel sounds are normal.      Palpations: Abdomen is soft.      Tenderness: There is no abdominal tenderness.   Musculoskeletal: Normal range of motion.   Skin:     General: Skin is warm and dry.   Neurological:      Mental Status: She is alert and oriented to person, place, and time.   Psychiatric:         Behavior: Behavior normal.         Thought Content: Thought content normal.         Judgment: Judgment normal.        Result Review :   The following data was reviewed by: DOMENIC Stover on 01/13/2021:    Data reviewed: Consultant notes Urgent care provider note from 12/24/2020          Assessment and Plan      Diagnoses and all orders for this visit:    1. Costochondritis (Primary)  Assessment & Plan:  Pain nearly resolved without taking prednisone as prescribed by urgent care.  We will do a short course of naproxen for 3 to 5 days then as needed after that.  Follow-up if persists.    Orders:  -     naproxen (NAPROSYN) 500 MG tablet; Take 1 tablet by mouth 2 (Two) Times a Day As Needed for Mild Pain .  Dispense: 30 tablet; Refill: 0    2. Fever blister  Assessment & Plan:  Recurrent fever blisters particularly when stressed.  We will do as needed acyclovir as this is worked well in the past    Orders:  -     Discontinue: acyclovir (Zovirax) 400 MG tablet; Take  Daily for 5 days if needed for fever blister  Dispense: 30 tablet; Refill: 0  -     acyclovir (Zovirax) 400 MG tablet; Take one  pill daily for 5 days if needed for fever blister  Dispense: 30 tablet; Refill: 0    3. Mild episode of recurrent major depressive disorder (CMS/HCC)  Assessment & Plan:  Psychological condition is improving with treatment.  Continue current treatment regimen.  With Viibryd as noted on medication list  Continue to follow with psychiatry as well    Orders:  -     vilazodone (Viibryd) 40 MG tablet tablet; Take 1 tablet by mouth Daily.  Dispense: 30 tablet; Refill: 2        Follow Up   Return for Next scheduled follow up.  Patient was given instructions and counseling regarding her condition or for health maintenance advice. Please see specific information pulled into the AVS if appropriate.

## 2021-01-13 NOTE — TELEPHONE ENCOUNTER
PHARMACY CALLED AND STATED THAT THEY NEED CLARIFICATION ON THE NUMBER OF PILLS PATIENT IS SUPPOSED TO TAKE EACH DAY.    PLEASE CONTACT PHARMACY TO [facility].    CALLBACK:  [phone]

## 2021-01-13 NOTE — PATIENT INSTRUCTIONS
Costochondritis  Costochondritis is swelling and irritation (inflammation) of the tissue (cartilage) that connects your ribs to your breastbone (sternum). This causes pain in the front of your chest. Usually, the pain:  · Starts gradually.  · Is in more than one rib.  This condition usually goes away on its own over time.  Follow these instructions at home:  · Do not do anything that makes your pain worse.  · If directed, put ice on the painful area:  ? Put ice in a plastic bag.  ? Place a towel between your skin and the bag.  ? Leave the ice on for 20 minutes, 2-3 times a day.  · If directed, put heat on the affected area as often as told by your doctor. Use the heat source that your doctor tells you to use, such as a moist heat pack or a heating pad.  ? Place a towel between your skin and the heat source.  ? Leave the heat on for 20-30 minutes.  ? Take off the heat if your skin turns bright red. This is very important if you cannot feel pain, heat, or cold. You may have a greater risk of getting burned.  · Take over-the-counter and prescription medicines only as told by your doctor.  · Return to your normal activities as told by your doctor. Ask your doctor what activities are safe for you.  · Keep all follow-up visits as told by your doctor. This is important.  Contact a doctor if:  · You have chills or a fever.  · Your pain does not go away or it gets worse.  · You have a cough that does not go away.  Get help right away if:  · You are short of breath.  This information is not intended to replace advice given to you by your health care provider. Make sure you discuss any questions you have with your health care provider.  Document Revised: 01/02/2019 Document Reviewed: 04/12/2017  Elsevier Patient Education © 2020 [employer]

## 2021-01-17 PROBLEM — F33.0 MILD EPISODE OF RECURRENT MAJOR DEPRESSIVE DISORDER (HCC): Status: ACTIVE | Noted: 2021-01-17

## 2021-01-17 PROBLEM — B00.1 FEVER BLISTER: Chronic | Status: ACTIVE | Noted: 2021-01-17

## 2021-01-17 PROBLEM — M94.0 COSTOCHONDRITIS: Status: ACTIVE | Noted: 2021-01-17

## 2021-01-17 PROBLEM — M25.50 MULTIPLE JOINT PAIN: Chronic | Status: ACTIVE | Noted: 2020-07-13

## 2021-01-17 PROBLEM — B00.1 FEVER BLISTER: Status: ACTIVE | Noted: 2021-01-17

## 2021-01-17 PROBLEM — F33.0 MILD EPISODE OF RECURRENT MAJOR DEPRESSIVE DISORDER (HCC): Chronic | Status: ACTIVE | Noted: 2021-01-17

## 2021-01-17 NOTE — ASSESSMENT & PLAN NOTE
Pain nearly resolved without taking prednisone as prescribed by urgent care.  We will do a short course of naproxen for 3 to 5 days then as needed after that.  Follow-up if persists.

## 2021-01-17 NOTE — ASSESSMENT & PLAN NOTE
Recurrent fever blisters particularly when stressed.  We will do as needed acyclovir as this is worked well in the past

## 2021-01-17 NOTE — ASSESSMENT & PLAN NOTE
Psychological condition is improving with treatment.  Continue current treatment regimen.  With Viibryd as noted on medication list  Continue to follow with psychiatry as well

## 2021-03-22 ENCOUNTER — OFFICE VISIT (OUTPATIENT)
Dept: INTERNAL MEDICINE | Facility: CLINIC | Age: 26
End: 2021-03-22

## 2021-03-22 VITALS
BODY MASS INDEX: 28.16 KG/M2 | TEMPERATURE: 97.5 F | HEART RATE: 81 BPM | DIASTOLIC BLOOD PRESSURE: 68 MMHG | OXYGEN SATURATION: 99 % | HEIGHT: 68 IN | SYSTOLIC BLOOD PRESSURE: 106 MMHG | RESPIRATION RATE: 16 BRPM | WEIGHT: 185.8 LBS

## 2021-03-22 DIAGNOSIS — Z00.00 ANNUAL PHYSICAL EXAM: Primary | ICD-10-CM

## 2021-03-22 DIAGNOSIS — Z11.59 ENCOUNTER FOR HEPATITIS C SCREENING TEST FOR LOW RISK PATIENT: ICD-10-CM

## 2021-03-22 DIAGNOSIS — F33.0 MILD EPISODE OF RECURRENT MAJOR DEPRESSIVE DISORDER (HCC): Chronic | ICD-10-CM

## 2021-03-22 PROBLEM — M94.0 COSTOCHONDRITIS: Status: RESOLVED | Noted: 2021-01-17 | Resolved: 2021-03-22

## 2021-03-22 PROBLEM — E66.9 OBESITY (BMI 35.0-39.9 WITHOUT COMORBIDITY): Status: RESOLVED | Noted: 2020-07-13 | Resolved: 2021-03-22

## 2021-03-22 PROCEDURE — 99395 PREV VISIT EST AGE 18-39: CPT | Performed by: NURSE PRACTITIONER

## 2021-03-22 RX ORDER — AZELASTINE 1 MG/ML
SPRAY, METERED NASAL
COMMUNITY
End: 2021-05-17

## 2021-03-22 RX ORDER — VILAZODONE HYDROCHLORIDE 40 MG/1
40 TABLET ORAL DAILY
Qty: 30 TABLET | Refills: 6 | Status: SHIPPED | OUTPATIENT
Start: 2021-03-22 | End: 2021-05-23 | Stop reason: SDUPTHER

## 2021-03-22 NOTE — ASSESSMENT & PLAN NOTE
Patient's depression is recurrent and is mild without psychosis. Their depression is currently in partial remission and the condition is improving with treatment. This will be reassessed at the next regular appointment. F/U as described:continue to FU with psych and FU with me in 6m.

## 2021-03-22 NOTE — PATIENT INSTRUCTIONS

## 2021-03-22 NOTE — PROGRESS NOTES
Patient Care Team:  She Beaver APRN as PCP - General (Nurse Practitioner)  Sung Bang Jr., MD (Psychiatry)  Juanito Dai MD as Consulting Physician (Orthopedic Surgery)  Berto Mccain MD as Consulting Physician (Allergy)     Chief complaint: Patient is in today for a physical          Patient is a 25 y.o. female who presents for her yearly physical exam.     HPI      She has depression.  She is currently on Viibryd.  Reports symptoms are well controlled at her current dose.  She does follow with psychiatry (Dr. Sung Bang).  I prescribe her Viibryd for her due to insurance constraints  She has not seen in a few months.     She had a lot of joint pain last year but is better since she started eating better and exercising       Fever blisters: using acyclovir PRN. Has had a couple of outbreaks. Typically occurs with her menses.       Health maintenance/lifestyle:  Health Maintenance   Topic Date Due   • ANNUAL PHYSICAL  03/18/2021   • COVID-19 Vaccine (2 - Moderna 2-dose series) 04/13/2021   • LIPID PANEL  09/28/2021   • PAP SMEAR  03/17/2023   • TDAP/TD VACCINES (2 - Td) 01/01/2025   • INFLUENZA VACCINE  Completed   • Pneumococcal Vaccine 0-64  Aged Out   • MENINGOCOCCAL VACCINE  Aged Out   • HEPATITIS C SCREENING  Discontinued       Immunization History   Administered Date(s) Administered   • COVID-19 (MODERNA) 03/16/2021   • Flulaval/Fluarix/Fluzone Quad 10/24/2020   • Influenza, Unspecified 10/24/2020   • Tdap 01/01/2015   • flucelvax quad pfs =>4 YRS 12/07/2019       Covid vaccine: She had her first vaccination through [facility]  Has appt for second vaccine  Influenza: Up-to-date  Tetanus: Up to date    Hep C screening: Never, will order today     Cancer-related family history is negative for Ovarian cancer, Breast cancer, Uterine cancer, Cervical cancer, Lung cancer, and Colon cancer.    Colon cancer screening: Patient denies colorectal symptoms. Will start screening at age  45-50  Last Completed Colonoscopy    Patient has no health maintenance due at this time         Mammogram: Will start screening at age 40  Self breast exam: does rarely. Denies concerning areas.     STI concerns: denies,  reports never being sexually active.    Pap:3/2020  Menses:   heavy  Patient's last menstrual period was 03/08/2021 (approximate).  She has tried contraception in the past and is not sure that she would want to be on any currently.     Eye exam: UTD, goes annually  Dental exam: UTD     Sunscreen use: wears  Seatbelt use: wears    Diet: whole food, plant based.   Exercise: regularly    Wt Readings from Last 3 Encounters:   03/22/21 84.3 kg (185 lb 12.8 oz)   01/13/21 91.2 kg (201 lb)   12/24/20 92.5 kg (204 lb)           Social History     Tobacco Use   Smoking Status Never Smoker   Smokeless Tobacco Never Used     Social History     Substance and Sexual Activity   Alcohol Use Not Currently   • Alcohol/week: 0.0 standard drinks    Comment: very rarely/on occasion       PHQ-2 Depression Screening  Little interest or pleasure in doing things? 0   Feeling down, depressed, or hopeless? 1 (several days but it is managed-takes med)   PHQ-2 Total Score 1           Review of Systems   Constitutional: Negative for activity change, appetite change, chills, diaphoresis, fatigue, fever, unexpected weight gain and unexpected weight loss.   HENT: Negative for voice change.    Eyes: Negative for blurred vision, double vision, pain and visual disturbance.   Respiratory: Negative for cough, chest tightness, shortness of breath and wheezing.    Cardiovascular: Negative for chest pain, palpitations and leg swelling.   Gastrointestinal: Negative for abdominal distention, abdominal pain, constipation, diarrhea, nausea and vomiting.   Endocrine: Negative for cold intolerance, heat intolerance, polydipsia, polyphagia and polyuria.   Genitourinary: Negative for difficulty urinating, frequency and urgency.    Musculoskeletal: Positive for arthralgias (knee). Negative for myalgias.   Skin: Negative for color change, dry skin and rash.   Neurological: Positive for headache (occasional around menses). Negative for dizziness, facial asymmetry, weakness, light-headedness, numbness and confusion.   Hematological: Negative for adenopathy. Does not bruise/bleed easily.   Psychiatric/Behavioral: Negative for decreased concentration and sleep disturbance. The patient is not nervous/anxious.          History  Social History     Socioeconomic History   • Marital status: Single     Spouse name: Not on file   • Number of children: Not on file   • Years of education: Not on file   • Highest education level: Not on file   Tobacco Use   • Smoking status: Never Smoker   • Smokeless tobacco: Never Used   Substance and Sexual Activity   • Alcohol use: Not Currently     Alcohol/week: 0.0 standard drinks     Comment: very rarely/on occasion   • Drug use: Never   • Sexual activity: Never     Past Medical History:   Diagnosis Date   • Allergic as a kid    seasonal chronic acute sinusitis - some times leads to strep   • Anxiety first noticed in middle school    coping   • Costochondritis 1/17/2021   • Depression    • Fractures    • Headache middle school    now know triggers to avoid-unable to avoid during pd & wk bf   • Pap smear for cervical cancer screening       Past Surgical History:   Procedure Laterality Date   • KNEE SURGERY Left 7/15/2021     Ortho Dr. Roman   • WISDOM TOOTH EXTRACTION        No Known Allergies   Family History   Problem Relation Age of Onset   • Migraines Mother    • Miscarriages / Stillbirths Mother         abt 3 miscarriages bf me   • Stroke Mother         TIA - inconclusive, but potentially stress related   • Arthritis Father    • Mental illness Father         ADHD (some anxiety managed in that)   • Stroke Paternal Uncle         heavy stress & unhealthy eating clogged 1 or 2 heart valves   • Arthritis Maternal  "Grandmother    • Ovarian cancer Neg Hx    • Breast cancer Neg Hx    • Uterine cancer Neg Hx    • Cervical cancer Neg Hx    • Lung cancer Neg Hx    • Colon cancer Neg Hx        Current Outpatient Medications:   •  acyclovir (Zovirax) 400 MG tablet, Take one pill daily for 5 days if needed for fever blister, Disp: 30 tablet, Rfl: 0  •  azelastine (ASTELIN) 0.1 % nasal spray, azelastine 137 mcg (0.1 %) nasal spray aerosol  SPRAY TWO SPRAYS IN EACH NOSTRIL TWICE A DAY, Disp: , Rfl:   •  cetirizine (zyrTEC) 10 MG tablet, , Disp: , Rfl:   •  fluticasone (FLONASE) 50 MCG/ACT nasal spray, , Disp: , Rfl:   •  Multiple Vitamins-Minerals (MULTIVITAMIN ADULT PO), Take 1 tablet by mouth Daily., Disp: , Rfl:   •  naproxen (NAPROSYN) 500 MG tablet, Take 1 tablet by mouth 2 (Two) Times a Day As Needed for Mild Pain ., Disp: 30 tablet, Rfl: 0  •  olopatadine (PATANOL) 0.1 % ophthalmic solution, , Disp: , Rfl:   •  vilazodone (Viibryd) 40 MG tablet tablet, Take 1 tablet by mouth Daily., Disp: 30 tablet, Rfl: 6                  /68   Pulse 81   Temp 97.5 °F (36.4 °C)   Resp 16   Ht 172.7 cm (68\")   Wt 84.3 kg (185 lb 12.8 oz)   LMP 03/08/2021 (Approximate)   SpO2 99%   Breastfeeding No   BMI 28.25 kg/m²       Physical Exam  Vitals and nursing note reviewed.   Constitutional:       General: She is not in acute distress.     Appearance: Normal appearance. She is well-developed, well-groomed and overweight. She is not diaphoretic.   HENT:      Head: Normocephalic and atraumatic.      Right Ear: External ear normal.      Left Ear: External ear normal.      Nose: Nose normal.   Eyes:      General: No scleral icterus.        Right eye: No discharge.         Left eye: No discharge.      Conjunctiva/sclera: Conjunctivae normal.      Pupils: Pupils are equal, round, and reactive to light.   Neck:      Thyroid: No thyroid mass, thyromegaly or thyroid tenderness.      Vascular: Normal carotid pulses. No carotid bruit or JVD.      " Trachea: No tracheal deviation.   Cardiovascular:      Rate and Rhythm: Normal rate and regular rhythm.      Chest Wall: PMI is not displaced. No thrill.      Pulses: No decreased pulses.      Heart sounds: Normal heart sounds. No murmur heard.   No friction rub. No gallop.    Pulmonary:      Effort: Pulmonary effort is normal. No respiratory distress.      Breath sounds: Normal breath sounds. No wheezing or rales.   Chest:      Chest wall: No tenderness.      Breasts: Breasts are symmetrical.         Right: No inverted nipple, mass, nipple discharge, skin change or tenderness.         Left: No inverted nipple, mass, nipple discharge, skin change or tenderness.   Abdominal:      General: Bowel sounds are normal. There is no distension.      Palpations: Abdomen is soft. There is no mass.      Tenderness: There is no abdominal tenderness.      Hernia: No hernia is present.   Musculoskeletal:         General: No tenderness or deformity. Normal range of motion.      Cervical back: Normal range of motion and neck supple.      Right lower leg: No edema.      Left lower leg: No edema.   Lymphadenopathy:      Head:      Right side of head: No submental, submandibular, tonsillar, preauricular, posterior auricular or occipital adenopathy.      Left side of head: No submental, submandibular, tonsillar, preauricular, posterior auricular or occipital adenopathy.      Cervical: No cervical adenopathy.   Skin:     General: Skin is warm and dry.      Coloration: Skin is not pale.      Findings: No erythema or rash.   Neurological:      Mental Status: She is alert and oriented to person, place, and time.      Deep Tendon Reflexes: Reflexes are normal and symmetric. Reflexes normal.   Psychiatric:         Behavior: Behavior normal.         Thought Content: Thought content normal.                   Diagnoses and all orders for this visit:    1. Annual physical exam (Primary)  -     CBC (No Diff); Future  -     Comprehensive Metabolic  Panel; Future  -     Lipid Panel; Future  -     HCV Antibody Rfx To Qnt PCR; Future    2. Encounter for hepatitis C screening test for low risk patient  -     HCV Antibody Rfx To Qnt PCR; Future    3. Mild episode of recurrent major depressive disorder (CMS/HCC)  Assessment & Plan:  Patient's depression is recurrent and is mild without psychosis. Their depression is currently in partial remission and the condition is improving with treatment. This will be reassessed at the next regular appointment. F/U as described:continue to FU with psych and FU with me in 6m.    Orders:  -     vilazodone (Viibryd) 40 MG tablet tablet; Take 1 tablet by mouth Daily.  Dispense: 30 tablet; Refill: 6       Labs  ordered as above- will notify of results and treat accordingly. If patient has not received results within one week via mychart or letter, they will notify my office  Immunizations and screenings: preventative/screenings are up-to-date/addressed as noted in the HPI.  Counseling: The patient is advised to continue current medications, continue current healthy lifestyle patterns and return for routine annual checkups  Follow up: Return in about 6 months (around 9/22/2021) for collect labs today.  Plan of care discussed with pt. They verbalized understanding and agreement.     She Beaver, APRN   3/22/2021   09:04 EDT              * Please note that portions of this note were completed with a voice recognition program. Efforts were made to edit the dictation but occasionally words are erroneously transcribed.

## 2021-03-23 LAB
ALBUMIN SERPL-MCNC: 4.4 G/DL (ref 3.9–5)
ALBUMIN/GLOB SERPL: 1.9 {RATIO} (ref 1.2–2.2)
ALP SERPL-CCNC: 72 IU/L (ref 39–117)
ALT SERPL-CCNC: 13 IU/L (ref 0–32)
AST SERPL-CCNC: 18 IU/L (ref 0–40)
BILIRUB SERPL-MCNC: 0.9 MG/DL (ref 0–1.2)
BUN SERPL-MCNC: 10 MG/DL (ref 6–20)
BUN/CREAT SERPL: 12 (ref 9–23)
CALCIUM SERPL-MCNC: 9.2 MG/DL (ref 8.7–10.2)
CHLORIDE SERPL-SCNC: 107 MMOL/L (ref 96–106)
CHOLEST SERPL-MCNC: 143 MG/DL (ref 100–199)
CO2 SERPL-SCNC: 23 MMOL/L (ref 20–29)
CREAT SERPL-MCNC: 0.86 MG/DL (ref 0.57–1)
ERYTHROCYTE [DISTWIDTH] IN BLOOD BY AUTOMATED COUNT: 12.2 % (ref 11.7–15.4)
GLOBULIN SER CALC-MCNC: 2.3 G/DL (ref 1.5–4.5)
GLUCOSE SERPL-MCNC: 89 MG/DL (ref 65–99)
HCT VFR BLD AUTO: 41.4 % (ref 34–46.6)
HCV AB S/CO SERPL IA: <0.1 S/CO RATIO (ref 0–0.9)
HCV AB SERPL QL IA: NORMAL
HDLC SERPL-MCNC: 56 MG/DL
HGB BLD-MCNC: 13.5 G/DL (ref 11.1–15.9)
LDLC SERPL CALC-MCNC: 72 MG/DL (ref 0–99)
MCH RBC QN AUTO: 29.5 PG (ref 26.6–33)
MCHC RBC AUTO-ENTMCNC: 32.6 G/DL (ref 31.5–35.7)
MCV RBC AUTO: 91 FL (ref 79–97)
PLATELET # BLD AUTO: 167 X10E3/UL (ref 150–450)
POTASSIUM SERPL-SCNC: 4.4 MMOL/L (ref 3.5–5.2)
PROT SERPL-MCNC: 6.7 G/DL (ref 6–8.5)
RBC # BLD AUTO: 4.57 X10E6/UL (ref 3.77–5.28)
SODIUM SERPL-SCNC: 141 MMOL/L (ref 134–144)
TRIGL SERPL-MCNC: 74 MG/DL (ref 0–149)
VLDLC SERPL CALC-MCNC: 15 MG/DL (ref 5–40)
WBC # BLD AUTO: 3.5 X10E3/UL (ref 3.4–10.8)

## 2021-05-17 ENCOUNTER — OFFICE VISIT (OUTPATIENT)
Dept: INTERNAL MEDICINE | Facility: CLINIC | Age: 26
End: 2021-05-17

## 2021-05-17 VITALS
OXYGEN SATURATION: 99 % | TEMPERATURE: 98.2 F | BODY MASS INDEX: 27.13 KG/M2 | HEIGHT: 68 IN | DIASTOLIC BLOOD PRESSURE: 70 MMHG | HEART RATE: 86 BPM | WEIGHT: 179 LBS | SYSTOLIC BLOOD PRESSURE: 108 MMHG | RESPIRATION RATE: 16 BRPM

## 2021-05-17 DIAGNOSIS — F33.1 MODERATE EPISODE OF RECURRENT MAJOR DEPRESSIVE DISORDER (HCC): Primary | ICD-10-CM

## 2021-05-17 DIAGNOSIS — F90.0 ATTENTION DEFICIT HYPERACTIVITY DISORDER (ADHD), PREDOMINANTLY INATTENTIVE TYPE: ICD-10-CM

## 2021-05-17 DIAGNOSIS — F41.9 ANXIETY: Chronic | ICD-10-CM

## 2021-05-17 PROCEDURE — 99213 OFFICE O/P EST LOW 20 MIN: CPT | Performed by: NURSE PRACTITIONER

## 2021-05-23 PROBLEM — F41.9 ANXIETY: Status: ACTIVE | Noted: 2021-05-23

## 2021-05-23 PROBLEM — F90.0 ATTENTION DEFICIT HYPERACTIVITY DISORDER (ADHD), PREDOMINANTLY INATTENTIVE TYPE: Status: ACTIVE | Noted: 2021-05-23

## 2021-05-23 PROBLEM — F90.0 ATTENTION DEFICIT HYPERACTIVITY DISORDER (ADHD), PREDOMINANTLY INATTENTIVE TYPE: Chronic | Status: ACTIVE | Noted: 2021-05-23

## 2021-05-23 PROBLEM — F41.9 ANXIETY: Chronic | Status: ACTIVE | Noted: 2021-05-23

## 2021-05-23 RX ORDER — VILAZODONE HYDROCHLORIDE 40 MG/1
40 TABLET ORAL DAILY
Qty: 30 TABLET | Refills: 2 | Status: SHIPPED | OUTPATIENT
Start: 2021-05-23 | End: 2021-09-08 | Stop reason: SDUPTHER

## 2021-05-24 ENCOUNTER — TELEPHONE (OUTPATIENT)
Dept: INTERNAL MEDICINE | Facility: CLINIC | Age: 26
End: 2021-05-24

## 2021-05-24 NOTE — TELEPHONE ENCOUNTER
----- Message from DOMENIC Wilkinson sent at 5/23/2021  9:51 AM EDT -----  Please send a copy of my note to her psychiatrist, Dr. Bang. Thanks

## 2021-09-08 ENCOUNTER — HOSPITAL ENCOUNTER (OUTPATIENT)
Dept: GENERAL RADIOLOGY | Facility: HOSPITAL | Age: 26
Discharge: HOME | End: 2021-09-08

## 2021-09-08 ENCOUNTER — LAB (OUTPATIENT)
Dept: LAB | Facility: HOSPITAL | Age: 26
End: 2021-09-08

## 2021-09-08 ENCOUNTER — OFFICE VISIT (OUTPATIENT)
Dept: INTERNAL MEDICINE | Facility: CLINIC | Age: 26
End: 2021-09-08

## 2021-09-08 VITALS
HEIGHT: 68 IN | DIASTOLIC BLOOD PRESSURE: 62 MMHG | OXYGEN SATURATION: 100 % | HEART RATE: 120 BPM | RESPIRATION RATE: 16 BRPM | BODY MASS INDEX: 25.07 KG/M2 | SYSTOLIC BLOOD PRESSURE: 110 MMHG | TEMPERATURE: 98.4 F | WEIGHT: 165.4 LBS

## 2021-09-08 DIAGNOSIS — F33.1 MODERATE EPISODE OF RECURRENT MAJOR DEPRESSIVE DISORDER (HCC): ICD-10-CM

## 2021-09-08 DIAGNOSIS — M54.6 ACUTE RIGHT-SIDED THORACIC BACK PAIN: ICD-10-CM

## 2021-09-08 DIAGNOSIS — M54.6 ACUTE RIGHT-SIDED THORACIC BACK PAIN: Primary | ICD-10-CM

## 2021-09-08 DIAGNOSIS — M94.0 COSTOCHONDRITIS: ICD-10-CM

## 2021-09-08 PROCEDURE — 86140 C-REACTIVE PROTEIN: CPT | Performed by: NURSE PRACTITIONER

## 2021-09-08 PROCEDURE — 85025 COMPLETE CBC W/AUTO DIFF WBC: CPT | Performed by: NURSE PRACTITIONER

## 2021-09-08 PROCEDURE — 99214 OFFICE O/P EST MOD 30 MIN: CPT | Performed by: NURSE PRACTITIONER

## 2021-09-08 PROCEDURE — 85652 RBC SED RATE AUTOMATED: CPT | Performed by: NURSE PRACTITIONER

## 2021-09-08 PROCEDURE — 72070 X-RAY EXAM THORAC SPINE 2VWS: CPT

## 2021-09-08 RX ORDER — VILAZODONE HYDROCHLORIDE 40 MG/1
40 TABLET ORAL DAILY
Qty: 30 TABLET | Refills: 6 | Status: SHIPPED | OUTPATIENT
Start: 2021-09-08 | End: 2022-04-11

## 2021-09-08 RX ORDER — METHOCARBAMOL 500 MG/1
500 TABLET, FILM COATED ORAL 4 TIMES DAILY PRN
Qty: 120 TABLET | Refills: 0 | Status: SHIPPED | OUTPATIENT
Start: 2021-09-08 | End: 2021-10-20

## 2021-09-08 RX ORDER — NAPROXEN 500 MG/1
500 TABLET ORAL 2 TIMES DAILY PRN
Qty: 30 TABLET | Refills: 2 | Status: SHIPPED | OUTPATIENT
Start: 2021-09-08 | End: 2022-11-04

## 2021-09-08 NOTE — PROGRESS NOTES
Melania Stevens presents to [facility] for     Chief Complaint  Chief Complaint   Patient presents with   • Depression     still taking medication, overall doing about the same    • Back Pain     chronic, she did have some ribs misplaced, would like x-ray and labs for inflammation         Subjective        History of Present Illness      Back pain   She tells me that she has some slipped ribs posteriorly.   Had had costochondritis recurrently.  Not bothering her right now.  Responds well to naproxen as needed when occurs.  Has mid back pain   Went to chiropractor 2 weeks ago and he did some manipulation to realign the ribs.   She is still having pain in her mid back which is worse if she lifts anything. Can feel her back/ ribs shift on the right with certain movements    depression - chronic. On viibryd.  Symptoms stable.  She does follow with psychiatry (Dr. Sung Bang).  I prescribe her Viibryd for her due to insurance constraints    Review of Systems  12 point ROS negative except for positives noted in HPI above    No Known Allergies  Current Outpatient Medications on File Prior to Visit   Medication Sig Dispense Refill   • acyclovir (Zovirax) 400 MG tablet Take one pill daily for 5 days if needed for fever blister 30 tablet 0   • amphetamine-dextroamphetamine (ADDERALL) 30 MG tablet Take 30 mg by mouth Daily.     • azelastine (ASTELIN) 0.1 % nasal spray 2 sprays into the nostril(s) as directed by provider Daily.     • cetirizine (zyrTEC) 10 MG tablet 10 mg Daily.     • Multiple Vitamins-Minerals (MULTIVITAMIN ADULT PO) Take 1 tablet by mouth Daily.     • olopatadine (PATADAY) 0.2 % solution ophthalmic solution 1 drop Daily.       No current facility-administered medications on file prior to visit.         The following portions of the patient's history were reviewed and updated as appropriate: allergies, current medications, past family history, past medical history, past  "social history, past surgical history and problem list and are available for review within electronic record    Objective     Result Review :                    Vital Signs:   /62   Pulse 120   Temp 98.4 °F (36.9 °C) (Infrared)   Resp 16   Ht 172.7 cm (67.99\")   Wt 75 kg (165 lb 6.4 oz)   SpO2 100%   BMI 25.15 kg/m²         Physical Exam  Vitals and nursing note reviewed.   Constitutional:       General: She is not in acute distress.     Appearance: Normal appearance. She is well-developed. She is not diaphoretic.   HENT:      Head: Normocephalic and atraumatic.   Eyes:      Conjunctiva/sclera: Conjunctivae normal.      Pupils: Pupils are equal, round, and reactive to light.   Neck:      Vascular: No JVD.   Cardiovascular:      Rate and Rhythm: Normal rate and regular rhythm.      Heart sounds: Normal heart sounds. No murmur heard.     Pulmonary:      Effort: Pulmonary effort is normal. No respiratory distress.      Breath sounds: Normal breath sounds.   Chest:      Chest wall: No deformity, swelling or tenderness.   Abdominal:      General: Bowel sounds are normal. There is no distension.      Palpations: Abdomen is soft.      Tenderness: There is no abdominal tenderness.   Musculoskeletal:         General: Normal range of motion.      Cervical back: Normal range of motion.      Thoracic back: Normal.      Lumbar back: Normal.   Skin:     General: Skin is warm and dry.      Coloration: Skin is not pale.      Findings: No erythema.   Neurological:      Mental Status: She is alert and oriented to person, place, and time.   Psychiatric:         Behavior: Behavior normal.         Thought Content: Thought content normal.         Judgment: Judgment normal.                 Assessment and Plan      Diagnoses and all orders for this visit:    1. Acute right-sided thoracic back pain (Primary)  -     naproxen (NAPROSYN) 500 MG tablet; Take 1 tablet by mouth 2 (Two) Times a Day As Needed for Mild Pain .  Dispense: " 30 tablet; Refill: 2  -     XR spine thoracic 2 vw; Future  -     C-reactive Protein; Future  -     Sedimentation Rate; Future  -     CBC & Differential; Future  -     methocarbamol (Robaxin) 500 MG tablet; Take 1 tablet by mouth 4 (Four) Times a Day As Needed (muscle tightness).  Dispense: 120 tablet; Refill: 0  We will check labs and imaging as above.  Can use methocarbamol as needed.  AE's discussed.  2. Moderate episode of recurrent major depressive disorder (CMS/HCC)  -     vilazodone (Viibryd) 40 MG tablet tablet; Take 1 tablet by mouth Daily.  Dispense: 30 tablet; Refill: 6  Symptoms well managed.  Continue Viibryd at current dose.  She should also continue to follow with her psychiatrist.  3. Costochondritis  -     naproxen (NAPROSYN) 500 MG tablet; Take 1 tablet by mouth 2 (Two) Times a Day As Needed for Mild Pain .  Dispense: 30 tablet; Refill: 2  Naproxen as needed to have on hand if recurrence of costochondritis.  No pain in office today.      Follow Up     Patient was given instructions and counseling regarding her condition or for health maintenance advice. Please see specific information pulled into the AVS if appropriate.   Any new medication's adverse effects have been discussed in detail with patient  Patient was encouraged to keep me informed of any acute changes, lack of improvement, or any new concerning symptoms.    Return in about 6 weeks (around 10/20/2021) for collect labs today.          * Please note that portions of this note were completed with a voice recognition program. Efforts were made to edit the dictation but occasionally words are erroneously transcribed.

## 2021-09-09 LAB
BASOPHILS # BLD AUTO: 0 X10E3/UL (ref 0–0.2)
BASOPHILS NFR BLD AUTO: 1 %
CRP SERPL-MCNC: <1 MG/L (ref 0–10)
EOSINOPHIL # BLD AUTO: 0 X10E3/UL (ref 0–0.4)
EOSINOPHIL NFR BLD AUTO: 1 %
ERYTHROCYTE [DISTWIDTH] IN BLOOD BY AUTOMATED COUNT: 14.2 % (ref 11.7–15.4)
ERYTHROCYTE [SEDIMENTATION RATE] IN BLOOD BY WESTERGREN METHOD: 2 MM/HR (ref 0–32)
HCT VFR BLD AUTO: 40.5 % (ref 34–46.6)
HGB BLD-MCNC: 12.5 G/DL (ref 11.1–15.9)
IMM GRANULOCYTES # BLD AUTO: 0 X10E3/UL (ref 0–0.1)
IMM GRANULOCYTES NFR BLD AUTO: 0 %
LYMPHOCYTES # BLD AUTO: 1.5 X10E3/UL (ref 0.7–3.1)
LYMPHOCYTES NFR BLD AUTO: 32 %
MCH RBC QN AUTO: 24.7 PG (ref 26.6–33)
MCHC RBC AUTO-ENTMCNC: 30.9 G/DL (ref 31.5–35.7)
MCV RBC AUTO: 80 FL (ref 79–97)
MONOCYTES # BLD AUTO: 0.4 X10E3/UL (ref 0.1–0.9)
MONOCYTES NFR BLD AUTO: 8 %
NEUTROPHILS # BLD AUTO: 2.7 X10E3/UL (ref 1.4–7)
NEUTROPHILS NFR BLD AUTO: 58 %
PLATELET # BLD AUTO: 257 X10E3/UL (ref 150–450)
RBC # BLD AUTO: 5.06 X10E6/UL (ref 3.77–5.28)
WBC # BLD AUTO: 4.7 X10E3/UL (ref 3.4–10.8)

## 2021-10-20 ENCOUNTER — OFFICE VISIT (OUTPATIENT)
Dept: INTERNAL MEDICINE | Facility: CLINIC | Age: 26
End: 2021-10-20

## 2021-10-20 ENCOUNTER — HOSPITAL ENCOUNTER (OUTPATIENT)
Dept: GENERAL RADIOLOGY | Facility: HOSPITAL | Age: 26
Discharge: HOME | End: 2021-10-20
Admitting: NURSE PRACTITIONER

## 2021-10-20 VITALS
SYSTOLIC BLOOD PRESSURE: 118 MMHG | TEMPERATURE: 97.5 F | BODY MASS INDEX: 25.22 KG/M2 | WEIGHT: 166.4 LBS | HEIGHT: 68 IN | OXYGEN SATURATION: 99 % | HEART RATE: 122 BPM | RESPIRATION RATE: 16 BRPM | DIASTOLIC BLOOD PRESSURE: 72 MMHG

## 2021-10-20 DIAGNOSIS — M25.60 JOINT STIFFNESS: ICD-10-CM

## 2021-10-20 DIAGNOSIS — M25.50 MULTIPLE JOINT PAIN: Primary | ICD-10-CM

## 2021-10-20 DIAGNOSIS — R07.81 RIB PAIN: ICD-10-CM

## 2021-10-20 PROCEDURE — 99213 OFFICE O/P EST LOW 20 MIN: CPT | Performed by: NURSE PRACTITIONER

## 2021-10-20 PROCEDURE — 71110 X-RAY EXAM RIBS BIL 3 VIEWS: CPT

## 2021-10-20 NOTE — PROGRESS NOTES
Melania Stevens presents to [facility] for     Chief Complaint  Chief Complaint   Patient presents with   • Back Pain     Acute right/sided back pain (Asking for referral to rheumotologist, Xray for alignment)          Subjective        History of Present Illness    She is having pain in her chest and back and feels like is out of allignment   pain in her midback at ribs 8,9, and 10.   Pain in her right lateral chest.    ribs flare out and are tender.    She is looking for a new chiropractor as well.   She wants to see rheumatology because of the overall pain even though labs have looked ok.   she also has pain in multiple joint: knees bilaterally,    she has been told she has cartilage issues in her knees since she was a kid.   She feels overal stiff.   She stretches and exercises a lot to manage her pain     Recently had labs to evaluate CRP, ESR, both were normal.    She also had a negative rheumatoid factor and PITA in 2020.  She is concerned about joint issues as well as autoimmune diseases.  She is requesting another x-ray to check alignment as well as a referral to rheumatology.    Review of Systems   Constitutional: Negative for fatigue, fever and unexpected weight loss.   Eyes: Negative for blurred vision, double vision and visual disturbance.   Respiratory: Negative for cough, shortness of breath and wheezing.    Cardiovascular: Positive for chest pain (muscular/rib pain ). Negative for palpitations and leg swelling.   Gastrointestinal: Negative for abdominal pain, constipation, diarrhea, nausea and vomiting.   Genitourinary: Negative for difficulty urinating, frequency and urgency.   Musculoskeletal: Positive for arthralgias, joint swelling and myalgias.   Skin: Negative for color change and rash.   Neurological: Negative for dizziness, weakness and headache.   Hematological: Negative for adenopathy. Does not bruise/bleed easily.         No Known Allergies  Current  "Outpatient Medications on File Prior to Visit   Medication Sig Dispense Refill   • acyclovir (Zovirax) 400 MG tablet Take one pill daily for 5 days if needed for fever blister 30 tablet 0   • amphetamine-dextroamphetamine (ADDERALL) 30 MG tablet Take 30 mg by mouth Daily.     • azelastine (ASTELIN) 0.1 % nasal spray 2 sprays into the nostril(s) as directed by provider Daily.     • cetirizine (zyrTEC) 10 MG tablet 10 mg Daily.     • Multiple Vitamins-Minerals (MULTIVITAMIN ADULT PO) Take 1 tablet by mouth Daily.     • naproxen (NAPROSYN) 500 MG tablet Take 1 tablet by mouth 2 (Two) Times a Day As Needed for Mild Pain . 30 tablet 2   • olopatadine (PATADAY) 0.2 % solution ophthalmic solution 1 drop Daily.     • vilazodone (Viibryd) 40 MG tablet tablet Take 1 tablet by mouth Daily. 30 tablet 6   • [DISCONTINUED] methocarbamol (Robaxin) 500 MG tablet Take 1 tablet by mouth 4 (Four) Times a Day As Needed (muscle tightness). 120 tablet 0     No current facility-administered medications on file prior to visit.         The following portions of the patient's history were reviewed and updated as appropriate: allergies, current medications, past family history, past medical history, past social history, past surgical history and problem list and are available for review within electronic record    Objective     Result Review :                    Vital Signs:   /72   Pulse (!) 122   Temp 97.5 °F (36.4 °C) (Infrared)   Resp 16   Ht 172.7 cm (68\")   Wt 75.5 kg (166 lb 6.4 oz)   SpO2 99%   BMI 25.30 kg/m²         Physical Exam  Vitals and nursing note reviewed.   Constitutional:       General: She is not in acute distress.     Appearance: Normal appearance. She is well-developed. She is not diaphoretic.   HENT:      Head: Normocephalic and atraumatic.   Eyes:      Conjunctiva/sclera: Conjunctivae normal.      Pupils: Pupils are equal, round, and reactive to light.   Neck:      Vascular: No JVD.   Cardiovascular:      " Rate and Rhythm: Normal rate and regular rhythm.      Heart sounds: Normal heart sounds. No murmur heard.      Pulmonary:      Effort: Pulmonary effort is normal. No respiratory distress.      Breath sounds: Normal breath sounds.   Chest:      Chest wall: No mass, lacerations, deformity, swelling, tenderness, crepitus or edema. There is no dullness to percussion.       Abdominal:      General: Bowel sounds are normal. There is no distension.      Palpations: Abdomen is soft.      Tenderness: There is no abdominal tenderness.   Musculoskeletal:         General: Normal range of motion.      Cervical back: Normal range of motion.      Thoracic back: Normal.      Lumbar back: Normal.   Skin:     General: Skin is warm and dry.      Coloration: Skin is not pale.      Findings: No erythema.   Neurological:      Mental Status: She is alert and oriented to person, place, and time.   Psychiatric:         Behavior: Behavior normal.         Thought Content: Thought content normal.         Judgment: Judgment normal.               Assessment and Plan      Diagnoses and all orders for this visit:    1. Multiple joint pain (Primary)  -     Ambulatory Referral to Rheumatology    2. Rib pain  -     XR ribs bilateral 3 vw; Future    3. Joint stiffness  -     Ambulatory Referral to Rheumatology      Given her recurrent complaints of the similar pain, we will refer to rheumatology.  We will also check rib x-ray to evaluate this limited to the right anterior chest wall in comparison to the left.  No tenderness noted with palpation during exam today.  Patient may use over-the-counter Tylenol 650 mg every 6 hours as needed pain      Follow Up     Patient was given instructions and counseling regarding her condition or for health maintenance advice. Please see specific information pulled into the AVS if appropriate.   Any new medication's adverse effects have been discussed in detail with patient  Patient was encouraged to keep me informed  of any acute changes, lack of improvement, or any new concerning symptoms.    Return if symptoms worsen or fail to improve.          Dictated Utilizing Dragon Dictation   Please note that portions of this note were completed with a voice recognition program.   Part of this note may be an electronic transcription/translation of spoken language to printed text using the Dragon Dictation System.

## 2021-10-30 ENCOUNTER — HOSPITAL ENCOUNTER (EMERGENCY)
Facility: HOSPITAL | Age: 26
Discharge: HOME | End: 2021-10-30
Attending: EMERGENCY MEDICINE | Admitting: EMERGENCY MEDICINE

## 2021-10-30 ENCOUNTER — APPOINTMENT (OUTPATIENT)
Dept: CT IMAGING | Facility: HOSPITAL | Age: 26
End: 2021-10-30

## 2021-10-30 VITALS
HEART RATE: 112 BPM | TEMPERATURE: 98.6 F | WEIGHT: 170 LBS | RESPIRATION RATE: 18 BRPM | DIASTOLIC BLOOD PRESSURE: 82 MMHG | OXYGEN SATURATION: 97 % | HEIGHT: 68 IN | SYSTOLIC BLOOD PRESSURE: 124 MMHG | BODY MASS INDEX: 25.76 KG/M2

## 2021-10-30 DIAGNOSIS — R07.81 RIB PAIN ON LEFT SIDE: Primary | ICD-10-CM

## 2021-10-30 DIAGNOSIS — R07.89 CHEST WALL PAIN: ICD-10-CM

## 2021-10-30 LAB
ALBUMIN SERPL-MCNC: 4.8 G/DL (ref 3.5–5.2)
ALBUMIN/GLOB SERPL: 2.2 G/DL
ALP SERPL-CCNC: 42 U/L (ref 39–117)
ALT SERPL W P-5'-P-CCNC: 21 U/L (ref 1–33)
ANION GAP SERPL CALCULATED.3IONS-SCNC: 12 MMOL/L (ref 5–15)
AST SERPL-CCNC: 21 U/L (ref 1–32)
B-HCG UR QL: NEGATIVE
BASOPHILS # BLD AUTO: 0.03 10*3/MM3 (ref 0–0.2)
BASOPHILS NFR BLD AUTO: 0.6 % (ref 0–1.5)
BILIRUB SERPL-MCNC: 0.7 MG/DL (ref 0–1.2)
BUN SERPL-MCNC: 12 MG/DL (ref 6–20)
BUN/CREAT SERPL: 13.5 (ref 7–25)
CALCIUM SPEC-SCNC: 9.4 MG/DL (ref 8.6–10.5)
CHLORIDE SERPL-SCNC: 104 MMOL/L (ref 98–107)
CO2 SERPL-SCNC: 25 MMOL/L (ref 22–29)
CREAT SERPL-MCNC: 0.89 MG/DL (ref 0.57–1)
DEPRECATED RDW RBC AUTO: 45.1 FL (ref 37–54)
EOSINOPHIL # BLD AUTO: 0.03 10*3/MM3 (ref 0–0.4)
EOSINOPHIL NFR BLD AUTO: 0.6 % (ref 0.3–6.2)
ERYTHROCYTE [DISTWIDTH] IN BLOOD BY AUTOMATED COUNT: 16 % (ref 12.3–15.4)
EXPIRATION DATE: NORMAL
GFR SERPL CREATININE-BSD FRML MDRD: 76 ML/MIN/1.73
GLOBULIN UR ELPH-MCNC: 2.2 GM/DL
GLUCOSE SERPL-MCNC: 101 MG/DL (ref 65–99)
HCT VFR BLD AUTO: 39.5 % (ref 34–46.6)
HGB BLD-MCNC: 12.4 G/DL (ref 12–15.9)
IMM GRANULOCYTES # BLD AUTO: 0.01 10*3/MM3 (ref 0–0.05)
IMM GRANULOCYTES NFR BLD AUTO: 0.2 % (ref 0–0.5)
INTERNAL NEGATIVE CONTROL: NORMAL
INTERNAL POSITIVE CONTROL: NORMAL
LYMPHOCYTES # BLD AUTO: 1.62 10*3/MM3 (ref 0.7–3.1)
LYMPHOCYTES NFR BLD AUTO: 32.4 % (ref 19.6–45.3)
Lab: NORMAL
MCH RBC QN AUTO: 24.2 PG (ref 26.6–33)
MCHC RBC AUTO-ENTMCNC: 31.4 G/DL (ref 31.5–35.7)
MCV RBC AUTO: 77.1 FL (ref 79–97)
MONOCYTES # BLD AUTO: 0.38 10*3/MM3 (ref 0.1–0.9)
MONOCYTES NFR BLD AUTO: 7.6 % (ref 5–12)
NEUTROPHILS NFR BLD AUTO: 2.93 10*3/MM3 (ref 1.7–7)
NEUTROPHILS NFR BLD AUTO: 58.6 % (ref 42.7–76)
NRBC BLD AUTO-RTO: 0 /100 WBC (ref 0–0.2)
PLATELET # BLD AUTO: 226 10*3/MM3 (ref 140–450)
PMV BLD AUTO: 9 FL (ref 6–12)
POTASSIUM SERPL-SCNC: 3.9 MMOL/L (ref 3.5–5.2)
PROT SERPL-MCNC: 7 G/DL (ref 6–8.5)
RBC # BLD AUTO: 5.12 10*6/MM3 (ref 3.77–5.28)
SODIUM SERPL-SCNC: 141 MMOL/L (ref 136–145)
WBC # BLD AUTO: 5 10*3/MM3 (ref 3.4–10.8)

## 2021-10-30 PROCEDURE — 81025 URINE PREGNANCY TEST: CPT | Performed by: EMERGENCY MEDICINE

## 2021-10-30 PROCEDURE — 99283 EMERGENCY DEPT VISIT LOW MDM: CPT

## 2021-10-30 PROCEDURE — 0 IOPAMIDOL PER 1 ML: Performed by: EMERGENCY MEDICINE

## 2021-10-30 PROCEDURE — 71275 CT ANGIOGRAPHY CHEST: CPT

## 2021-10-30 PROCEDURE — 85025 COMPLETE CBC W/AUTO DIFF WBC: CPT | Performed by: EMERGENCY MEDICINE

## 2021-10-30 PROCEDURE — 93005 ELECTROCARDIOGRAM TRACING: CPT | Performed by: EMERGENCY MEDICINE

## 2021-10-30 PROCEDURE — 80053 COMPREHEN METABOLIC PANEL: CPT | Performed by: EMERGENCY MEDICINE

## 2021-10-30 RX ORDER — MORPHINE SULFATE 4 MG/ML
4 INJECTION, SOLUTION INTRAMUSCULAR; INTRAVENOUS ONCE
Status: DISCONTINUED | OUTPATIENT
Start: 2021-10-30 | End: 2021-10-30 | Stop reason: HOSPADM

## 2021-10-30 RX ORDER — IBUPROFEN 800 MG/1
800 TABLET ORAL
Qty: 15 TABLET | Refills: 0 | Status: SHIPPED | OUTPATIENT
Start: 2021-10-30 | End: 2023-03-21

## 2021-10-30 RX ORDER — SODIUM CHLORIDE 0.9 % (FLUSH) 0.9 %
10 SYRINGE (ML) INJECTION AS NEEDED
Status: DISCONTINUED | OUTPATIENT
Start: 2021-10-30 | End: 2021-10-30 | Stop reason: HOSPADM

## 2021-10-30 RX ORDER — ONDANSETRON 2 MG/ML
4 INJECTION INTRAMUSCULAR; INTRAVENOUS ONCE
Status: DISCONTINUED | OUTPATIENT
Start: 2021-10-30 | End: 2021-10-30 | Stop reason: HOSPADM

## 2021-10-30 RX ORDER — KETOROLAC TROMETHAMINE 15 MG/ML
15 INJECTION, SOLUTION INTRAMUSCULAR; INTRAVENOUS ONCE
Status: DISCONTINUED | OUTPATIENT
Start: 2021-10-30 | End: 2021-10-30 | Stop reason: HOSPADM

## 2021-10-30 RX ADMIN — IOPAMIDOL 75 ML: 755 INJECTION, SOLUTION INTRAVENOUS at 15:19

## 2021-10-31 LAB
QT INTERVAL: 342 MS
QTC INTERVAL: 456 MS

## 2022-01-03 PROCEDURE — U0004 COV-19 TEST NON-CDC HGH THRU: HCPCS | Performed by: NURSE PRACTITIONER

## 2022-01-04 ENCOUNTER — TELEPHONE (OUTPATIENT)
Dept: INTERNAL MEDICINE | Facility: CLINIC | Age: 27
End: 2022-01-04

## 2022-01-04 NOTE — TELEPHONE ENCOUNTER
PATIENT WAS SEEN AT URGENT CARE YESTERDAY.  THEY DID COVID AND FLU BUT SHE WOULD LIKE BLOOD WORK FOR [facility].  CAN WE ORDER THIS?    PLEASE CALL [phone]

## 2022-01-13 ENCOUNTER — TELEPHONE (OUTPATIENT)
Dept: INTERNAL MEDICINE | Facility: CLINIC | Age: 27
End: 2022-01-13

## 2022-01-13 NOTE — TELEPHONE ENCOUNTER
"Caller: Melania Stevens    Relationship: Self    Best call back number: [phone]    What is the best time to reach you: AFTER 1PM TO TALK IN PERSON, BUT CAN LEAVE A DETAILED MESSAGE ANYTIME    Who are you requesting to speak with (clinical staff, provider,  specific staff member): CLINICAL STAFF TO ORDER LABS (LEFT A MESSAGE 1/4/22- \"HAS LINGERING SYMPTOMS OF MONO AS SHE HAS HAD IN THE PAST\")    Do you know the name of the person who called: N/A    What was the call regarding: REQUESTING LABS ORDERS TO BE SENT IN FOR MONO- STATES SHE TALKED TO SOMEONE A COUPLE WEEKS AGO WITH URGENT TREATMENT AND THEY TOLD HER THEY CAN NOT ORDER LABS FOR MONO AND NEEDS THEM FROM PCP. HAS TESTED NEGATIVE FOR COVID TEST 1/10/22.    Do you require a callback: PATIENT WOULD LIKE MESSAGE SENT TO [facility] LETTING HER KNOW WHEN THE LAB ORDERS ARE READY. WOULD LIKE TO GO TO THE PRIMARY CARE LAB DEPARTMENT THAT SHE NORMALLY GOES TO.  "

## 2022-03-14 ENCOUNTER — OFFICE VISIT (OUTPATIENT)
Dept: INTERNAL MEDICINE | Facility: CLINIC | Age: 27
End: 2022-03-14

## 2022-03-14 VITALS
OXYGEN SATURATION: 99 % | SYSTOLIC BLOOD PRESSURE: 100 MMHG | HEIGHT: 68 IN | DIASTOLIC BLOOD PRESSURE: 60 MMHG | TEMPERATURE: 97.7 F | HEART RATE: 123 BPM | BODY MASS INDEX: 25.79 KG/M2 | WEIGHT: 170.2 LBS

## 2022-03-14 DIAGNOSIS — M94.0 SLIPPED RIB SYNDROME: ICD-10-CM

## 2022-03-14 DIAGNOSIS — F41.9 ANXIETY: ICD-10-CM

## 2022-03-14 DIAGNOSIS — F33.1 MODERATE EPISODE OF RECURRENT MAJOR DEPRESSIVE DISORDER: ICD-10-CM

## 2022-03-14 DIAGNOSIS — R07.81 RIB PAIN: Primary | ICD-10-CM

## 2022-03-14 PROCEDURE — 99214 OFFICE O/P EST MOD 30 MIN: CPT | Performed by: NURSE PRACTITIONER

## 2022-03-14 RX ORDER — FEXOFENADINE HCL 180 MG/1
180 TABLET ORAL DAILY
COMMUNITY
Start: 2022-02-19 | End: 2022-11-04

## 2022-03-14 RX ORDER — BUPROPION HYDROCHLORIDE 150 MG/1
TABLET ORAL
COMMUNITY
Start: 2022-02-16 | End: 2022-03-16 | Stop reason: SDUPTHER

## 2022-03-14 NOTE — PROGRESS NOTES
Melania Stevens presents to [facility] for     Chief Complaint  Chief Complaint   Patient presents with   • Rib Pain     No known injury          Subjective        History of Present Illness      Anxiety and depression - Wants me to take over her wellbutrin. Tolerating well. Follows with psych.     Still having pain in her ribs and back -   Pain with movement- rotation  Pain with palpation of anterior ribs.   Pain with deep breath.   Trouble sleeping due to discomfort.   Pain with sitting up if she does not have pillow and heating pad.   Has to wear really loose bra.   always feels crunching and rolling on the left.   She is interested in completing MRI  To further evaluate.     Recently had labs to evaluate CRP, ESR, both were normal.    She also had a negative rheumatoid factor and PITA in 2020.  She is concerned about joint issues as well as autoimmune diseases.  She is requesting another x-ray to check alignment as well as a referral to rheumatology.    Has GYN appt scheduled soon       Answers for HPI/ROS submitted by the patient on 3/13/2022  Please describe your symptoms.: CHRONIC, worsening., - rib pain most URGENT - anterior left (~ribs 8-10/12). mvmnt caused some degree of misalignment (& now calcification?/thickness on cartilage) & now staying misaligned despite chiro care ETC to align posterior connection of same ribs off nerve. I can fouzia. feel left lower ribs roll over each other when I move. (felt sensation for at least 5 years and the feeling of it jabbing me for more than that). 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 coping best i can with heating pad, myofascial massage, stretches etc, -*note - cont. overall joint pain/deep ache + crunch/click fouzia. in rib cage @ costal cartilage connections (related to mvmnt) - & where joints get painfully stiff and  extremely tender. helps to stretch near daily, but some joint hypermobility is sensitive, -*note - my menstrual cycle is DEBILITATING & flares all of the above. gyno appt scheduled  Have you had these symptoms before?: Yes  How long have you been having these symptoms?: Greater than 2 weeks  Please list any medications you are currently taking for this condition.: naproxen or ibuprofen when I am at the end of my capacity to tolerate the pain/need sleep etc.  Please describe any probable cause for these symptoms. : This is what I am digging into (again) to find out more helpful information - I have tried A LOT and my pain has gone on since at least high school (10+ years ago!), but just worsened. I know there's a pattern of various joint pain at connections/cartilage (some joint pain/crunch since I was a kid). I know all I have tried makes sense mechanically, but flares my pain because my ribs are just not stable/aligned fully. I know my muscles have compensated/spazmed over the years because they are trying to do the work of other muscles or tendons/ligaments. I know my menstrual cycle flares EVERYTHING terribly. I know I take care of myself fairly well and still feel RUN OVER most days. Managing my pain is my full time job - the various pain has slowly cut things out of my life  and now I recognize rib pain specifically a massive issue. As well as overall joint pains/alignment or mobility. *Taking care of myself more showed me what symptoms continued, so I am more clear on what I need to pursue  What is the primary reason for your visit?: Other        Review of Systems   Constitutional: Negative for fatigue, fever and unexpected weight loss.   Eyes: Negative for blurred vision, double vision and visual disturbance.   Respiratory: Negative for cough, shortness of breath and wheezing.    Cardiovascular: Positive for chest pain (muscular/rib pain ). Negative for palpitations and leg swelling.   Gastrointestinal:  "Negative for abdominal pain, constipation, diarrhea, nausea and vomiting.   Genitourinary: Negative for difficulty urinating, frequency and urgency.   Musculoskeletal: Positive for arthralgias, joint swelling and myalgias.   Skin: Negative for color change and rash.   Neurological: Negative for dizziness, weakness and headache.   Hematological: Negative for adenopathy. Does not bruise/bleed easily.         No Known Allergies  Current Outpatient Medications on File Prior to Visit   Medication Sig Dispense Refill   • azelastine (ASTELIN) 0.1 % nasal spray 2 sprays into the nostril(s) as directed by provider Daily.     • fexofenadine (ALLEGRA) 180 MG tablet      • ibuprofen (ADVIL,MOTRIN) 800 MG tablet Take 1 tablet by mouth 3 (Three) Times a Day With Meals. 15 tablet 0   • Multiple Vitamins-Minerals (MULTIVITAMIN ADULT PO) Take 1 tablet by mouth Daily.     • naproxen (NAPROSYN) 500 MG tablet Take 1 tablet by mouth 2 (Two) Times a Day As Needed for Mild Pain . 30 tablet 2   • olopatadine (PATADAY) 0.2 % solution ophthalmic solution 1 drop Daily.     • vilazodone (Viibryd) 40 MG tablet tablet Take 1 tablet by mouth Daily. 30 tablet 6     No current facility-administered medications on file prior to visit.         The following portions of the patient's history were reviewed and updated as appropriate: allergies, current medications, past family history, past medical history, past social history, past surgical history and problem list and are available for review within electronic record    Objective   Vital Signs:   /60   Pulse (!) 123   Temp 97.7 °F (36.5 °C)   Ht 172.7 cm (68\")   Wt 77.2 kg (170 lb 3.2 oz)   SpO2 99%   BMI 25.88 kg/m²         Physical Exam  Vitals and nursing note reviewed.   Constitutional:       General: She is not in acute distress.     Appearance: Normal appearance. She is well-developed. She is not diaphoretic.   HENT:      Head: Normocephalic and atraumatic.   Eyes:      " Conjunctiva/sclera: Conjunctivae normal.      Pupils: Pupils are equal, round, and reactive to light.   Neck:      Vascular: No JVD.   Cardiovascular:      Rate and Rhythm: Normal rate and regular rhythm.      Heart sounds: Normal heart sounds. No murmur heard.  Pulmonary:      Effort: Pulmonary effort is normal. No respiratory distress.      Breath sounds: Normal breath sounds.   Chest:      Chest wall: Deformity and tenderness present. No mass, lacerations, swelling, crepitus or edema. There is no dullness to percussion.       Abdominal:      General: Bowel sounds are normal. There is no distension.      Palpations: Abdomen is soft.      Tenderness: There is no abdominal tenderness.   Musculoskeletal:         General: Normal range of motion.      Cervical back: Normal range of motion.      Thoracic back: Normal.      Lumbar back: Normal.   Skin:     General: Skin is warm and dry.      Coloration: Skin is not pale.      Findings: No erythema.   Neurological:      Mental Status: She is alert and oriented to person, place, and time.   Psychiatric:         Behavior: Behavior normal.         Thought Content: Thought content normal.         Judgment: Judgment normal.                 Assessment and Plan      Diagnoses and all orders for this visit:    1. Rib pain (Primary)  -     Ambulatory Referral to Cardiothoracic Surgery  -     MRI chest wo contrast; Future  We will go ahead and get her set up for MRI as well as refer over to Dr. Caban at  cardiothoracic surgery as she has done some work with slipped rib syndrome which is what I suspect is causing Melania's discomfort.  2. Slipped rib syndrome-suspected  -     Ambulatory Referral to Cardiothoracic Surgery  -     MRI chest wo contrast; Future    3. Moderate episode of recurrent major depressive disorder (HCC)  -     buPROPion XL (WELLBUTRIN XL) 150 MG 24 hr tablet; Take 1 tablet by mouth Every Morning.  Dispense: 30 tablet; Refill: 6    4. Anxiety  -     buPROPion  XL (WELLBUTRIN XL) 150 MG 24 hr tablet; Take 1 tablet by mouth Every Morning.  Dispense: 30 tablet; Refill: 6    Continue to follow psychiatry.  We will go ahead and start prescribing her Wellbutrin at her request.      Follow Up     Patient was given instructions and counseling regarding her condition or for health maintenance advice. Please see specific information pulled into the AVS if appropriate.   Any new medication's adverse effects have been discussed in detail with patient  Patient was encouraged to keep me informed of any acute changes, lack of improvement, or any new concerning symptoms.    Return if symptoms worsen or fail to improve.          Dictated Utilizing Dragon Dictation   Please note that portions of this note were completed with a voice recognition program.   Part of this note may be an electronic transcription/translation of spoken language to printed text using the Dragon Dictation System.

## 2022-03-16 ENCOUNTER — TELEPHONE (OUTPATIENT)
Dept: INTERNAL MEDICINE | Facility: CLINIC | Age: 27
End: 2022-03-16

## 2022-03-16 RX ORDER — BUPROPION HYDROCHLORIDE 150 MG/1
150 TABLET ORAL EVERY MORNING
Qty: 30 TABLET | Refills: 6 | Status: SHIPPED | OUTPATIENT
Start: 2022-03-16 | End: 2022-10-11

## 2022-03-16 NOTE — TELEPHONE ENCOUNTER
Caller: Melania Stevens    Relationship: Self    Best call back number: [phone]    What is the best time to reach you: NA    Who are you requesting to speak with (clinical staff, provider,  specific staff member):  CLINICAL    Do you know the name of the person who called: NA    What was the call regarding: PATIENT CALLED TO FOLLOW UP REGARDING STATUS OF MEDICATION ORDER FOR WELLBUTRIN, MRI PRE-AUTHORIZATION @ [facility], AND ORTHO REFERRAL FOR PATIENTS RIBS.    Do you require a callback: YES      PHARMACY:    [facility] [address] [place] [address] - [phone] [facility] [phone]   [phone]

## 2022-03-16 NOTE — TELEPHONE ENCOUNTER
I called and talked with Melania  about this.  We are going to hold off on the MRI for now although she may be interested in MRI in the near future if it takes too long to get her referral completed.  I found a  Dr. Celena Johns at  who has done some work with slipped rib syndrome that I am referring her to.  They will likely do a dynamic ultrasound.  We will let them evaluate and see if she needs any additional diagnostics.    She would also like me to take over her Wellbutrin as discussed at her visit.  Her prescription was sent.

## 2022-03-18 ENCOUNTER — TELEPHONE (OUTPATIENT)
Dept: INTERNAL MEDICINE | Facility: CLINIC | Age: 27
End: 2022-03-18

## 2022-03-18 NOTE — TELEPHONE ENCOUNTER
Caller: Jovani Rowell    Relationship: Self    Best call back number  :[phone]    What is the best time to reach you: ANYTIME    Who are you requesting to speak with (clinical staff, provider,  specific staff member): LUISA HERRERA    Do you know the name of the person who called: JOVANI ROWELL     What was the call regarding:  PATIENT DOES WANT TO SEND OFF FOR PRE APPROVAL FOR AN MRI. PATIENT DID SEE IN HER CHART FOR THE REFERRAL FOR [facility] IS April 20, 2022    Do you require a callback: YES

## 2022-03-21 NOTE — TELEPHONE ENCOUNTER
LVM informing the patient that the MRI order has been placed and someone would contact her to set up the appointment. Office number was provided in case of any questions or concerns.

## 2022-03-22 ENCOUNTER — TELEPHONE (OUTPATIENT)
Dept: INTERNAL MEDICINE | Facility: CLINIC | Age: 27
End: 2022-03-22

## 2022-03-22 NOTE — TELEPHONE ENCOUNTER
Caller: Melania Stevens    Relationship to patient: Self    Best call back number:    [phone]     Patient is needing: PATIENT IS NEEDING THE MRI SET UP AT [facility]

## 2022-03-31 ENCOUNTER — TELEPHONE (OUTPATIENT)
Dept: INTERNAL MEDICINE | Facility: CLINIC | Age: 27
End: 2022-03-31

## 2022-04-01 ENCOUNTER — TELEPHONE (OUTPATIENT)
Dept: INTERNAL MEDICINE | Facility: CLINIC | Age: 27
End: 2022-04-01

## 2022-04-01 NOTE — TELEPHONE ENCOUNTER
Contacted patient as well as Dr Caban's office at [facility] and made them aware that insurance will not approve the MRI that was wanted prior to patient appointment. It was understood and patient will still be able to keep her appointment that is scheduled with Dr Caban.

## 2022-04-09 DIAGNOSIS — F33.1 MODERATE EPISODE OF RECURRENT MAJOR DEPRESSIVE DISORDER: ICD-10-CM

## 2022-04-11 RX ORDER — VILAZODONE HYDROCHLORIDE 40 MG/1
TABLET ORAL
Qty: 30 TABLET | Refills: 6 | Status: SHIPPED | OUTPATIENT
Start: 2022-04-11 | End: 2022-11-04 | Stop reason: SDUPTHER

## 2022-04-11 NOTE — TELEPHONE ENCOUNTER
Rx Refill Note  Requested Prescriptions     Pending Prescriptions Disp Refills   • Viibryd 40 MG tablet tablet [Pharmacy Med Name: VIIBRYD 40 MG TABLET] 30 tablet 6     Sig: TAKE ONE TABLET BY MOUTH DAILY      Last filled:  Last office visit with prescribing clinician: 3/14/2022      Next office visit with prescribing clinician: Visit date not found     April MAURILIO Ghotra MA  04/11/22, 07:50 EDT

## 2022-05-03 ENCOUNTER — OFFICE VISIT (OUTPATIENT)
Dept: INTERNAL MEDICINE | Facility: CLINIC | Age: 27
End: 2022-05-03

## 2022-05-03 ENCOUNTER — LAB (OUTPATIENT)
Dept: LAB | Facility: HOSPITAL | Age: 27
End: 2022-05-03

## 2022-05-03 VITALS
BODY MASS INDEX: 26.55 KG/M2 | HEART RATE: 85 BPM | SYSTOLIC BLOOD PRESSURE: 106 MMHG | WEIGHT: 175.2 LBS | HEIGHT: 68 IN | OXYGEN SATURATION: 99 % | TEMPERATURE: 98.2 F | RESPIRATION RATE: 16 BRPM | DIASTOLIC BLOOD PRESSURE: 60 MMHG

## 2022-05-03 DIAGNOSIS — R53.82 CHRONIC FATIGUE: ICD-10-CM

## 2022-05-03 DIAGNOSIS — F41.9 ANXIETY: ICD-10-CM

## 2022-05-03 DIAGNOSIS — Z00.00 ANNUAL PHYSICAL EXAM: ICD-10-CM

## 2022-05-03 DIAGNOSIS — Z00.00 ANNUAL PHYSICAL EXAM: Primary | ICD-10-CM

## 2022-05-03 DIAGNOSIS — F33.1 MODERATE EPISODE OF RECURRENT MAJOR DEPRESSIVE DISORDER: ICD-10-CM

## 2022-05-03 DIAGNOSIS — R07.81 RIB PAIN: ICD-10-CM

## 2022-05-03 PROBLEM — M94.0 SLIPPED RIB SYNDROME: Status: ACTIVE | Noted: 2022-04-06

## 2022-05-03 LAB
25(OH)D3 SERPL-MCNC: 46.9 NG/ML (ref 30–100)
ALBUMIN SERPL-MCNC: 4.6 G/DL (ref 3.5–5.2)
ALBUMIN/GLOB SERPL: 1.8 G/DL
ALP SERPL-CCNC: 43 U/L (ref 39–117)
ALT SERPL W P-5'-P-CCNC: 20 U/L (ref 1–33)
ANION GAP SERPL CALCULATED.3IONS-SCNC: 11.7 MMOL/L (ref 5–15)
AST SERPL-CCNC: 16 U/L (ref 1–32)
BILIRUB SERPL-MCNC: 0.6 MG/DL (ref 0–1.2)
BUN SERPL-MCNC: 14 MG/DL (ref 6–20)
BUN/CREAT SERPL: 16.5 (ref 7–25)
CALCIUM SPEC-SCNC: 9.2 MG/DL (ref 8.6–10.5)
CHLORIDE SERPL-SCNC: 104 MMOL/L (ref 98–107)
CHOLEST SERPL-MCNC: 130 MG/DL (ref 0–200)
CO2 SERPL-SCNC: 22.3 MMOL/L (ref 22–29)
CREAT SERPL-MCNC: 0.85 MG/DL (ref 0.57–1)
DEPRECATED RDW RBC AUTO: 45 FL (ref 37–54)
EGFRCR SERPLBLD CKD-EPI 2021: 95.8 ML/MIN/1.73
ERYTHROCYTE [DISTWIDTH] IN BLOOD BY AUTOMATED COUNT: 14.6 % (ref 12.3–15.4)
FERRITIN SERPL-MCNC: 11 NG/ML (ref 13–150)
GLOBULIN UR ELPH-MCNC: 2.5 GM/DL
GLUCOSE SERPL-MCNC: 89 MG/DL (ref 65–99)
HCT VFR BLD AUTO: 43.5 % (ref 34–46.6)
HDLC SERPL-MCNC: 62 MG/DL (ref 40–60)
HGB BLD-MCNC: 13.7 G/DL (ref 12–15.9)
LDLC SERPL CALC-MCNC: 57 MG/DL (ref 0–100)
LDLC/HDLC SERPL: 0.95 {RATIO}
MAGNESIUM SERPL-MCNC: 2 MG/DL (ref 1.6–2.6)
MCH RBC QN AUTO: 26.1 PG (ref 26.6–33)
MCHC RBC AUTO-ENTMCNC: 31.5 G/DL (ref 31.5–35.7)
MCV RBC AUTO: 83 FL (ref 79–97)
PLATELET # BLD AUTO: 161 10*3/MM3 (ref 140–450)
PMV BLD AUTO: 11.6 FL (ref 6–12)
POTASSIUM SERPL-SCNC: 4 MMOL/L (ref 3.5–5.2)
PROT SERPL-MCNC: 7.1 G/DL (ref 6–8.5)
RBC # BLD AUTO: 5.24 10*6/MM3 (ref 3.77–5.28)
SODIUM SERPL-SCNC: 138 MMOL/L (ref 136–145)
TRIGL SERPL-MCNC: 47 MG/DL (ref 0–150)
VIT B12 BLD-MCNC: 583 PG/ML (ref 211–946)
VLDLC SERPL-MCNC: 11 MG/DL (ref 5–40)
WBC NRBC COR # BLD: 3.99 10*3/MM3 (ref 3.4–10.8)

## 2022-05-03 PROCEDURE — 3008F BODY MASS INDEX DOCD: CPT | Performed by: NURSE PRACTITIONER

## 2022-05-03 PROCEDURE — 80053 COMPREHEN METABOLIC PANEL: CPT | Performed by: NURSE PRACTITIONER

## 2022-05-03 PROCEDURE — 80061 LIPID PANEL: CPT | Performed by: NURSE PRACTITIONER

## 2022-05-03 PROCEDURE — 83735 ASSAY OF MAGNESIUM: CPT | Performed by: NURSE PRACTITIONER

## 2022-05-03 PROCEDURE — 82728 ASSAY OF FERRITIN: CPT | Performed by: NURSE PRACTITIONER

## 2022-05-03 PROCEDURE — 82306 VITAMIN D 25 HYDROXY: CPT | Performed by: NURSE PRACTITIONER

## 2022-05-03 PROCEDURE — 2014F MENTAL STATUS ASSESS: CPT | Performed by: NURSE PRACTITIONER

## 2022-05-03 PROCEDURE — 99395 PREV VISIT EST AGE 18-39: CPT | Performed by: NURSE PRACTITIONER

## 2022-05-03 PROCEDURE — 82607 VITAMIN B-12: CPT | Performed by: NURSE PRACTITIONER

## 2022-05-03 PROCEDURE — 85027 COMPLETE CBC AUTOMATED: CPT | Performed by: NURSE PRACTITIONER

## 2022-05-03 NOTE — PROGRESS NOTES
Patient Care Team:  She Beaver APRN as PCP - General (Nurse Practitioner)  Sung Bang Jr., MD (Psychiatry)  Juanito Dai MD as Consulting Physician (Orthopedic Surgery)  Berto Mccain MD as Consulting Physician (Allergy)  Cleve George MD as Consulting Physician (Rheumatology)     Chief complaint: Patient is in today for a physical          Patient is a 26 y.o. female who presents for her yearly physical exam.     HPI       seeing Dr. Mayur mccray at  for possible slipped rib syndrome.   Waiting on MRI     She wants to start taking suplements- wants some labs sbefore she starts.   Takes low dose of OTC mag and omega 3 currently    Depression - chronic. Well controlled.  Follows with psych. I prescribe her meds. No refill needed today    Health maintenance/lifestyle:  Health Maintenance   Topic Date Due   • COVID-19 Vaccine (3 - Booster for Moderna series) 09/13/2021   • LIPID PANEL  03/22/2022   • ANNUAL PHYSICAL  03/23/2022   • INFLUENZA VACCINE  08/01/2022   • PAP SMEAR  03/17/2023   • TDAP/TD VACCINES (2 - Td or Tdap) 01/01/2025   • Pneumococcal Vaccine 0-64  Aged Out   • HEPATITIS C SCREENING  Discontinued       Immunization History   Administered Date(s) Administered   • COVID-19 (MODERNA) 1st, 2nd, 3rd Dose Only 03/16/2021, 04/13/2021   • FluLaval/Fluarix/Fluzone >6 10/24/2020   • Influenza, Unspecified 10/24/2020   • Tdap 01/01/2015   • flucelvax quad pfs =>4 YRS 12/07/2019       Covid vaccine: Due for booster but she wants to wait on this for now  Influenza: Due fall 2022  Tetanus: Due 2025    Cancer-related family history is negative for Ovarian cancer, Breast cancer, Uterine cancer, Cervical cancer, Lung cancer, and Colon cancer.  Will start colon cancer screenings at age 43.  Will start breast cancer screenings with mammograms at age 38.       reports never being sexually active.  STI concerns: Denies  Menses:    No LMP recorded.  Pap: Has GYN appt next week.    Last Pap was normal in 3/20     Diet: Fairly healthy  Exercise: Regularly    Social History     Tobacco Use   Smoking Status Never Smoker   Smokeless Tobacco Never Used     Social History     Substance and Sexual Activity   Alcohol Use Yes   • Alcohol/week: 0.0 standard drinks    Comment: very rarely/on occasion         Review of Systems   Constitutional: Negative for fatigue, fever and unexpected weight loss.   Eyes: Negative for blurred vision, double vision and visual disturbance.   Respiratory: Negative for cough, shortness of breath and wheezing.    Cardiovascular: Positive for chest pain (muscular/rib pain ). Negative for palpitations and leg swelling.   Gastrointestinal: Negative for abdominal pain, constipation, diarrhea, nausea and vomiting.   Genitourinary: Negative for difficulty urinating, frequency and urgency.   Musculoskeletal: Positive for arthralgias, joint swelling and myalgias.   Skin: Negative for color change and rash.   Neurological: Negative for dizziness, weakness and headache.   Hematological: Negative for adenopathy. Does not bruise/bleed easily.         History  Social History     Socioeconomic History   • Marital status: Single   Tobacco Use   • Smoking status: Never Smoker   • Smokeless tobacco: Never Used   Vaping Use   • Vaping Use: Never used   Substance and Sexual Activity   • Alcohol use: Yes     Alcohol/week: 0.0 standard drinks     Comment: very rarely/on occasion   • Drug use: Never   • Sexual activity: Never     Past Medical History:   Diagnosis Date   • Allergic as a kid    seasonal chronic acute sinusitis - some times leads to strep   • Anxiety first noticed in middle school    coping   • Costochondritis 1/17/2021   • Depression    • Fractures    • Headache middle school    now know triggers to avoid-unable to avoid during pd & wk bf   • Pap smear for cervical cancer screening       Past Surgical History:   Procedure Laterality Date   • KNEE SURGERY Left 7/15/2021      "Ortho Dr. Roman   • WISDOM TOOTH EXTRACTION        No Known Allergies   Family History   Problem Relation Age of Onset   • Migraines Mother    • Miscarriages / Stillbirths Mother         abt 3 miscarriages bf me   • Stroke Mother         TIA - inconclusive, but potentially stress related   • Arthritis Father    • Mental illness Father         ADHD (some anxiety managed in that)   • Stroke Paternal Uncle         heavy stress & unhealthy eating clogged 1 or 2 heart valves   • Arthritis Maternal Grandmother    • Ovarian cancer Neg Hx    • Breast cancer Neg Hx    • Uterine cancer Neg Hx    • Cervical cancer Neg Hx    • Lung cancer Neg Hx    • Colon cancer Neg Hx        Current Outpatient Medications:   •  azelastine (ASTELIN) 0.1 % nasal spray, 2 sprays into the nostril(s) as directed by provider Daily., Disp: , Rfl:   •  buPROPion XL (WELLBUTRIN XL) 150 MG 24 hr tablet, Take 1 tablet by mouth Every Morning., Disp: 30 tablet, Rfl: 6  •  fexofenadine (ALLEGRA) 180 MG tablet, Take 180 mg by mouth Daily., Disp: , Rfl:   •  ibuprofen (ADVIL,MOTRIN) 800 MG tablet, Take 1 tablet by mouth 3 (Three) Times a Day With Meals. (Patient taking differently: Take 800 mg by mouth Every 6 (Six) Hours As Needed.), Disp: 15 tablet, Rfl: 0  •  Multiple Vitamins-Minerals (MULTIVITAMIN ADULT PO), Take 1 tablet by mouth Daily., Disp: , Rfl:   •  naproxen (NAPROSYN) 500 MG tablet, Take 1 tablet by mouth 2 (Two) Times a Day As Needed for Mild Pain ., Disp: 30 tablet, Rfl: 2  •  olopatadine (PATADAY) 0.2 % solution ophthalmic solution, 1 drop As Needed., Disp: , Rfl:   •  Viibryd 40 MG tablet tablet, TAKE ONE TABLET BY MOUTH DAILY, Disp: 30 tablet, Rfl: 6                  /60   Pulse 85   Temp 98.2 °F (36.8 °C) (Infrared)   Resp 16   Ht 172.7 cm (68\")   Wt 79.5 kg (175 lb 3.2 oz)   SpO2 99%   Breastfeeding No   BMI 26.64 kg/m²       Physical Exam  Vitals and nursing note reviewed.   Constitutional:       General: She is not in " acute distress.     Appearance: Normal appearance. She is well-developed. She is not diaphoretic.   HENT:      Head: Normocephalic and atraumatic.   Eyes:      Conjunctiva/sclera: Conjunctivae normal.   Neck:      Vascular: No JVD.   Cardiovascular:      Rate and Rhythm: Normal rate and regular rhythm.      Pulses:           Dorsalis pedis pulses are 2+ on the right side and 2+ on the left side.        Posterior tibial pulses are 2+ on the right side and 2+ on the left side.      Heart sounds: Normal heart sounds. No murmur heard.  Pulmonary:      Effort: Pulmonary effort is normal. No respiratory distress.      Breath sounds: Normal breath sounds.   Chest:      Chest wall: No tenderness.   Abdominal:      General: Bowel sounds are normal. There is no distension.      Palpations: Abdomen is soft. There is no mass.      Tenderness: There is no abdominal tenderness. There is no guarding or rebound.      Hernia: No hernia is present.   Musculoskeletal:         General: Normal range of motion.      Cervical back: Normal range of motion.   Skin:     General: Skin is warm and dry.      Coloration: Skin is not pale.      Findings: No erythema or rash.   Neurological:      Mental Status: She is alert and oriented to person, place, and time.      Coordination: Coordination normal.   Psychiatric:         Behavior: Behavior normal.         Thought Content: Thought content normal.         Judgment: Judgment normal.                   Diagnoses and all orders for this visit:    1. Annual physical exam (Primary)  -     Lipid Panel; Future  -     Vitamin D 25 Hydroxy; Future  -     Magnesium; Future  -     Ferritin; Future  -     CBC (No Diff); Future  -     Comprehensive Metabolic Panel; Future  -     Vitamin B12; Future    2. Chronic fatigue  -     Lipid Panel; Future  -     Vitamin D 25 Hydroxy; Future  -     Magnesium; Future  -     Ferritin; Future  -     CBC (No Diff); Future  -     Comprehensive Metabolic Panel; Future  -      Vitamin B12; Future    3. Moderate episode of recurrent major depressive disorder (HCC)  Well-controlled.  Continue Viibryd and Wellbutrin.  Continue to see psychiatry as well.  No refills needed today.  She will let me know once needed.  4. Anxiety  Well-controlled continue plan as noted in #3  5. Rib pain         Labs  ordered as above- will notify of results and treat accordingly. If patient has not received results within one week via mychart or letter, they will notify my office  Immunizations and screenings:  preventative/screenings are up-to-date/addressed as noted in the HPI.  Counseling: The patient is advised to continue current medications, continue current healthy lifestyle patterns and return for routine annual checkups  Health Maintenance reviewed     Follow up: Return in about 6 months (around 11/3/2022) for and need to collect labs today.  Plan of care discussed with pt. They verbalized understanding and agreement.     Electronically signed by : DOMENIC Stover   5/3/2022   12:59 EDT              Dictated Utilizing Dragon Dictation   Please note that portions of this note were completed with a voice recognition program.   Part of this note may be an electronic transcription/translation of spoken language to printed text using the Dragon Dictation System.

## 2022-10-10 DIAGNOSIS — F33.1 MODERATE EPISODE OF RECURRENT MAJOR DEPRESSIVE DISORDER: ICD-10-CM

## 2022-10-10 DIAGNOSIS — F41.9 ANXIETY: ICD-10-CM

## 2022-10-11 RX ORDER — BUPROPION HYDROCHLORIDE 150 MG/1
TABLET ORAL
Qty: 30 TABLET | Refills: 0 | Status: SHIPPED | OUTPATIENT
Start: 2022-10-11 | End: 2022-11-04 | Stop reason: SDUPTHER

## 2022-10-11 NOTE — TELEPHONE ENCOUNTER
Rx Refill Note  Requested Prescriptions     Pending Prescriptions Disp Refills   • buPROPion XL (WELLBUTRIN XL) 150 MG 24 hr tablet [Pharmacy Med Name: buPROPion HCL  MG TABLET] 30 tablet 6     Sig: TAKE ONE TABLET BY MOUTH EVERY MORNING      Last filled: 03/16/2022  Last office visit with prescribing clinician: 5/3/2022      Next office visit with prescribing clinician: 11/4/2022 April MAURILIO Ghotra MA  10/11/22, 09:56 EDT

## 2022-11-04 ENCOUNTER — LAB (OUTPATIENT)
Dept: LAB | Facility: HOSPITAL | Age: 27
End: 2022-11-04

## 2022-11-04 ENCOUNTER — OFFICE VISIT (OUTPATIENT)
Dept: INTERNAL MEDICINE | Facility: CLINIC | Age: 27
End: 2022-11-04

## 2022-11-04 VITALS
SYSTOLIC BLOOD PRESSURE: 108 MMHG | HEART RATE: 74 BPM | TEMPERATURE: 96.8 F | OXYGEN SATURATION: 99 % | DIASTOLIC BLOOD PRESSURE: 64 MMHG | BODY MASS INDEX: 26.61 KG/M2 | WEIGHT: 175 LBS | RESPIRATION RATE: 18 BRPM

## 2022-11-04 DIAGNOSIS — F33.1 MODERATE EPISODE OF RECURRENT MAJOR DEPRESSIVE DISORDER: ICD-10-CM

## 2022-11-04 DIAGNOSIS — F41.9 ANXIETY: Primary | ICD-10-CM

## 2022-11-04 DIAGNOSIS — R79.0 LOW IRON STORES: ICD-10-CM

## 2022-11-04 LAB
DEPRECATED RDW RBC AUTO: 38.6 FL (ref 37–54)
ERYTHROCYTE [DISTWIDTH] IN BLOOD BY AUTOMATED COUNT: 13.3 % (ref 12.3–15.4)
FERRITIN SERPL-MCNC: 17.5 NG/ML (ref 13–150)
HCT VFR BLD AUTO: 37.4 % (ref 34–46.6)
HGB BLD-MCNC: 12.6 G/DL (ref 12–15.9)
MCH RBC QN AUTO: 27.3 PG (ref 26.6–33)
MCHC RBC AUTO-ENTMCNC: 33.7 G/DL (ref 31.5–35.7)
MCV RBC AUTO: 81.1 FL (ref 79–97)
PLATELET # BLD AUTO: 205 10*3/MM3 (ref 140–450)
PMV BLD AUTO: 10.1 FL (ref 6–12)
RBC # BLD AUTO: 4.61 10*6/MM3 (ref 3.77–5.28)
WBC NRBC COR # BLD: 3.82 10*3/MM3 (ref 3.4–10.8)

## 2022-11-04 PROCEDURE — 99214 OFFICE O/P EST MOD 30 MIN: CPT | Performed by: NURSE PRACTITIONER

## 2022-11-04 PROCEDURE — 82728 ASSAY OF FERRITIN: CPT | Performed by: NURSE PRACTITIONER

## 2022-11-04 PROCEDURE — 85027 COMPLETE CBC AUTOMATED: CPT | Performed by: NURSE PRACTITIONER

## 2022-11-04 RX ORDER — BUPROPION HYDROCHLORIDE 150 MG/1
150 TABLET ORAL EVERY MORNING
Qty: 30 TABLET | Refills: 6 | Status: SHIPPED | OUTPATIENT
Start: 2022-11-04

## 2022-11-04 RX ORDER — VILAZODONE HYDROCHLORIDE 40 MG/1
40 TABLET ORAL DAILY
Qty: 30 TABLET | Refills: 6 | Status: SHIPPED | OUTPATIENT
Start: 2022-11-04

## 2022-11-04 NOTE — PROGRESS NOTES
Melania Stevens presents to [facility] for     Chief Complaint  Chief Complaint   Patient presents with   • Depression     6 month f/u Wellbutrin    • Anxiety       PCP:  She Beaver APRN    Subjective        History of Present Illness  Anxiety and depression-chronic.  She is currently on Wellbutrin and Viibryd.  She does follow with psychiatry routinely.  I prescribed her medications for insurance reasons.  She is not interested in medication adjustments today   She does still have some anxiety and depression but feels as though she wants to focus more on the physical aspects that contribute to this rather than the mental.  She does have some anhedonia, sleep disturbance at times, fatigue, feeling anxious, worried, and has trouble relaxing.  No HI/SI.    seeing Dr. 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  Now doing PT and seeing neuro as well.      She was noted to have low ferritin. She did not take iron supplement.   She tried to consume diet higher in iron.   She has taken various OTC supplement.        PHQ-9 Depression Screening  Little interest or pleasure in doing things? 1-->several days   Feeling down, depressed, or hopeless? 1-->several days   Trouble falling or staying asleep, or sleeping too much? 1-->several days (Depends on the day)   Feeling tired or having little energy? 1-->several days   Poor appetite or overeating? 0-->not at all   Feeling bad about yourself - or that you are a failure or have let yourself or your family down? 0-->not at all   Trouble concentrating on things, such as reading the newspaper or watching television? 3-->nearly every day   Moving or speaking so slowly that other people could have noticed? Or the opposite - being so fidgety or restless that you have been moving around a lot more than usual? 3-->nearly every day (Depends on the day)   Thoughts that you would be better off dead, or of hurting yourself in some way?  0-->not at all   PHQ-9 Total Score 10   If you checked off any problems, how difficult have these problems made it for you to do your work, take care of things at home, or get along with other people? very difficult     Anxiety CHEO-7  Feeling nervous, anxious or on edge: Several days  Not being able to stop or control worrying: Several days  Worrying too much about different things: Several days  Trouble Relaxing: Several days  Being so restless that it is hard to sit still: Nearly every day  Becoming easily annoyed or irritable: Several days ([place])  Feeling afraid as if something awful might happen: Not at all  CHEO 7 Total Score: 8  If you checked any problems, how difficult have these problems made it for you to do your work, take care of things at home, or get along with other people: Extremely difficult         Health Maintenance:   Health Maintenance   Topic Date Due   • COVID-19 Vaccine (3 - Booster for Moderna series) 06/08/2021   • INFLUENZA VACCINE  08/01/2022   • PAP SMEAR  03/17/2023   • LIPID PANEL  05/03/2023   • ANNUAL PHYSICAL  05/04/2023   • TDAP/TD VACCINES (2 - Td or Tdap) 01/01/2025   • Pneumococcal Vaccine 0-64  Aged Out   • HEPATITIS C SCREENING  Discontinued       She declines flu and COVID vaccinations today-counseled      Review of Systems   Musculoskeletal: Positive for arthralgias and myalgias.   Neurological: Positive for headache.   Psychiatric/Behavioral: Positive for decreased concentration, sleep disturbance, depressed mood and stress. Negative for self-injury and suicidal ideas. The patient is nervous/anxious.          No Known Allergies  Current Outpatient Medications on File Prior to Visit   Medication Sig Dispense Refill   • ibuprofen (ADVIL,MOTRIN) 800 MG tablet Take 1 tablet by mouth 3 (Three) Times a Day With Meals. (Patient taking differently: Take 1 tablet by mouth Every 6 (Six) Hours As Needed.) 15 tablet 0   • Multiple Vitamins-Minerals (MULTIVITAMIN ADULT PO) Take 1  tablet by mouth Daily.     • [DISCONTINUED] buPROPion XL (WELLBUTRIN XL) 150 MG 24 hr tablet TAKE ONE TABLET BY MOUTH EVERY MORNING 30 tablet 0   • [DISCONTINUED] Viibryd 40 MG tablet tablet TAKE ONE TABLET BY MOUTH DAILY 30 tablet 6   • [DISCONTINUED] azelastine (ASTELIN) 0.1 % nasal spray 2 sprays into the nostril(s) as directed by provider Daily.     • [DISCONTINUED] fexofenadine (ALLEGRA) 180 MG tablet Take 180 mg by mouth Daily.     • [DISCONTINUED] naproxen (NAPROSYN) 500 MG tablet Take 1 tablet by mouth 2 (Two) Times a Day As Needed for Mild Pain . 30 tablet 2   • [DISCONTINUED] olopatadine (PATADAY) 0.2 % solution ophthalmic solution 1 drop As Needed.       No current facility-administered medications on file prior to visit.         The following portions of the patient's history were reviewed and updated as appropriate: allergies, current medications, past family history, past medical history, past social history, past surgical history and problem list and are available for review within electronic record    Objective     Result Review :     The following data was reviewed by: DOMENIC Stover on 11/04/2022:  CMP    CMP 5/3/22   Glucose 89   BUN 14   Creatinine 0.85   Sodium 138   Potassium 4.0   Chloride 104   Calcium 9.2   Albumin 4.60   Total Bilirubin 0.6   Alkaline Phosphatase 43   AST (SGOT) 16   ALT (SGPT) 20           CBC    CBC 5/3/22   WBC 3.99   RBC 5.24   Hemoglobin 13.7   Hematocrit 43.5   MCV 83.0   MCH 26.1 (A)   MCHC 31.5   RDW 14.6   Platelets 161   (A) Abnormal value            Lab Results   Component Value Date    FERRITIN 11.00 (L) 05/03/2022                      Vital Signs:   /64 (BP Location: Right arm, Patient Position: Sitting, Cuff Size: Adult)   Pulse 74   Temp 96.8 °F (36 °C) (Infrared)   Resp 18   Wt 79.4 kg (175 lb)   SpO2 99%   BMI 26.61 kg/m²         Physical Exam  Vitals and nursing note reviewed.   Constitutional:       Appearance: Normal appearance. She  is well-developed.   HENT:      Head: Normocephalic and atraumatic.   Eyes:      Conjunctiva/sclera: Conjunctivae normal.   Neck:      Thyroid: No thyroid mass or thyromegaly.   Cardiovascular:      Rate and Rhythm: Normal rate and regular rhythm.      Heart sounds: Normal heart sounds.   Pulmonary:      Effort: Pulmonary effort is normal.      Breath sounds: Normal breath sounds.   Abdominal:      General: Bowel sounds are normal.      Palpations: Abdomen is soft.      Tenderness: There is no abdominal tenderness.   Musculoskeletal:         General: Normal range of motion.      Cervical back: Normal range of motion.   Skin:     General: Skin is warm and dry.   Neurological:      Mental Status: She is alert and oriented to person, place, and time.   Psychiatric:         Attention and Perception: Attention normal. She is attentive.         Mood and Affect: Mood is anxious. Mood is not depressed. Affect is not angry or inappropriate.         Speech: Speech normal.         Behavior: Behavior normal.         Thought Content: Thought content normal.         Cognition and Memory: Cognition and memory normal.         Judgment: Judgment normal.                 Assessment and Plan      Diagnoses and all orders for this visit:    1. Anxiety (Primary)  -     buPROPion XL (WELLBUTRIN XL) 150 MG 24 hr tablet; Take 1 tablet by mouth Every Morning.  Dispense: 30 tablet; Refill: 6    2. Moderate episode of recurrent major depressive disorder (HCC)  -     buPROPion XL (WELLBUTRIN XL) 150 MG 24 hr tablet; Take 1 tablet by mouth Every Morning.  Dispense: 30 tablet; Refill: 6  -     vilazodone (Viibryd) 40 MG tablet tablet; Take 1 tablet by mouth Daily.  Dispense: 30 tablet; Refill: 6  Anxiety and depression are uncontrolled but she is resistant to adjusting medications.  I have identified no safety risks.  There is no evidence of psychosis.  We will continue her Viibryd and bupropion at current doses.  if she changes her mind about  medication adjustments, she will let me know/follow-up.    3. Low iron stores  -     Ferritin; Future  -     CBC (No Diff); Future  Continue diet with iron rich foods.  Recheck CBC and ferritin.  Consider iron supplementation if not improving.      Follow Up     Patient was given instructions and counseling regarding her condition or for health maintenance advice. Please see specific information pulled into the AVS if appropriate.   Any new medication's adverse effects have been discussed in detail with patient  Patient was encouraged to keep me informed of any acute changes, lack of improvement, or any new concerning symptoms.    Return in 6 months (on 5/4/2023) for Annual, and need to collect labs today.          Dictated Utilizing Dragon Dictation   Please note that portions of this note were completed with a voice recognition program.   Part of this note may be an electronic transcription/translation of spoken language to printed text using the Dragon Dictation System.

## 2023-02-20 ENCOUNTER — OFFICE VISIT (OUTPATIENT)
Dept: INTERNAL MEDICINE | Facility: CLINIC | Age: 28
End: 2023-02-20
Payer: MEDICAID

## 2023-02-20 VITALS
HEIGHT: 68 IN | WEIGHT: 175.8 LBS | HEART RATE: 100 BPM | TEMPERATURE: 97.3 F | BODY MASS INDEX: 26.64 KG/M2 | OXYGEN SATURATION: 98 %

## 2023-02-20 DIAGNOSIS — J02.9 SORE THROAT: ICD-10-CM

## 2023-02-20 DIAGNOSIS — R09.81 NASAL CONGESTION: ICD-10-CM

## 2023-02-20 DIAGNOSIS — R05.9 COUGH, UNSPECIFIED TYPE: Primary | ICD-10-CM

## 2023-02-20 LAB
EXPIRATION DATE: NORMAL
HETEROPH AB SER QL LA: NEGATIVE
INTERNAL CONTROL: NORMAL
Lab: NORMAL

## 2023-02-20 PROCEDURE — 99213 OFFICE O/P EST LOW 20 MIN: CPT | Performed by: STUDENT IN AN ORGANIZED HEALTH CARE EDUCATION/TRAINING PROGRAM

## 2023-02-20 PROCEDURE — 86308 HETEROPHILE ANTIBODY SCREEN: CPT | Performed by: STUDENT IN AN ORGANIZED HEALTH CARE EDUCATION/TRAINING PROGRAM

## 2023-02-20 RX ORDER — DEXTROMETHORPHAN HYDROBROMIDE AND PROMETHAZINE HYDROCHLORIDE 15; 6.25 MG/5ML; MG/5ML
5 SYRUP ORAL 4 TIMES DAILY PRN
Qty: 240 ML | Refills: 0 | Status: SHIPPED | OUTPATIENT
Start: 2023-02-20 | End: 2023-02-20

## 2023-02-20 RX ORDER — METHYLPREDNISOLONE 4 MG/1
TABLET ORAL
Qty: 21 TABLET | Refills: 0 | Status: SHIPPED | OUTPATIENT
Start: 2023-02-20 | End: 2023-03-21

## 2023-02-20 RX ORDER — IPRATROPIUM BROMIDE 42 UG/1
2 SPRAY, METERED NASAL 4 TIMES DAILY
Qty: 15 ML | Refills: 0 | Status: SHIPPED | OUTPATIENT
Start: 2023-02-20 | End: 2023-03-21

## 2023-02-20 RX ORDER — BENZONATATE 100 MG/1
100 CAPSULE ORAL 3 TIMES DAILY PRN
Qty: 30 CAPSULE | Refills: 0 | Status: SHIPPED | OUTPATIENT
Start: 2023-02-20 | End: 2023-03-21

## 2023-02-20 NOTE — PROGRESS NOTES
Office Note     Name: Melania Stevens    : 1995     MRN: [id number]     Chief Complaint  Sore Throat ([facility] F/U on Monday Scratchy throat believes she has mono. Was tested for covid/flu strep all neg ), Nasal Congestion, Cough, and Generalized Body Aches    Subjective     History of Present Illness:  Melania Stevens is a 27 y.o. female who presents today for       Follow up to urgent care visit on 2023. Initally presented due to  sore throat, body aches, postnasal drainage, and nonproductive cough ongoing since Monday. She has hx of Covid about 1 year ago, and had mono in her teens. Negative testing for flu/covid at Urgent care Since then patient continue to have fatigue, an intermittent dry cough that wakes her up at night, nasal congestion and post nasal drip. No fever, no shortness of breath, no headache.  Has tried otc delsym, unsure if any improvement in her symptoms.    Review of Systems:   Review of Systems   All other systems reviewed and are negative.      Past Medical History:   Past Medical History:   Diagnosis Date   • ADHD (attention deficit hyperactivity disorder) hindsight, since kid. but diagnosed late @ age 26    family history of ADD   • Allergic as a kid    seasonal chronic acute sinusitis - some times leads to strep   • Anemia hindsight, i first remember symptoms in middle school    heavy menstrual cycle   • Anxiety first noticed in middle school    coping   • Costochondritis 2021   • Depression    • Fractures    • Headache middle school    now know triggers to avoid-unable to avoid during pd & wk bf   • History of medical problems     Adrenal Fatigue - tested 2022, hormonal imbalance (adrenal, lower testosterone and estrogen) and lower dopamine and norephenephrine - tested 2022   • Pap smear for cervical cancer screening        Past Surgical History:   Past Surgical History:   Procedure Laterality Date   • JOINT REPLACEMENT  July 15, 2020    MPFL reconstruction   •  KNEE SURGERY Left 7/15/2021     Ortho Dr. Roman   • WISDOM TOOTH EXTRACTION         Family History:   Family History   Problem Relation Age of Onset   • Migraines Mother    • Miscarriages / Stillbirths Mother         abt 3 miscarriages bf me (had cysts & then endometrial ablation after my birth)   • Stroke Mother         TIA - inconclusive, but potentially stress related   • Anxiety disorder Mother    • Arthritis Father    • Mental illness Father         ADHD   • Depression Father    • Stroke Paternal Uncle         heavy stress & unhealthy eating clogged 1 or 2 heart valves   • Arthritis Maternal Grandmother    • Anxiety disorder Sister    • Mental illness Sister         ADHD   • Ovarian cancer Neg Hx    • Breast cancer Neg Hx    • Uterine cancer Neg Hx    • Cervical cancer Neg Hx    • Lung cancer Neg Hx    • Colon cancer Neg Hx        Social History:   Social History     Socioeconomic History   • Marital status: Single   Tobacco Use   • Smoking status: Never   • Smokeless tobacco: Never   Vaping Use   • Vaping Use: Never used   Substance and Sexual Activity   • Alcohol use: Yes     Comment: very rarely/on occasion   • Drug use: Never   • Sexual activity: Never       Immunizations:   Immunization History   Administered Date(s) Administered   • COVID-19 (MODERNA) 1st, 2nd, 3rd Dose Only 03/16/2021, 04/13/2021   • FluLaval/Fluzone >6mos 10/24/2020   • Influenza, Unspecified 10/24/2020   • Tdap 01/01/2015   • flucelvax quad pfs =>4 YRS 12/07/2019        Medications:     Current Outpatient Medications:   •  buPROPion XL (WELLBUTRIN XL) 150 MG 24 hr tablet, Take 1 tablet by mouth Every Morning., Disp: 30 tablet, Rfl: 6  •  ibuprofen (ADVIL,MOTRIN) 800 MG tablet, Take 1 tablet by mouth 3 (Three) Times a Day With Meals. (Patient taking differently: Take 800 mg by mouth Every 6 (Six) Hours As Needed.), Disp: 15 tablet, Rfl: 0  •  Multiple Vitamins-Minerals (MULTIVITAMIN ADULT PO), Take 1 tablet by mouth Daily., Disp: ,  "Rfl:   •  vilazodone (Viibryd) 40 MG tablet tablet, Take 1 tablet by mouth Daily., Disp: 30 tablet, Rfl: 6  •  benzonatate (Tessalon Perles) 100 MG capsule, Take 1 capsule by mouth 3 (Three) Times a Day As Needed for Cough., Disp: 30 capsule, Rfl: 0  •  ipratropium (ATROVENT) 0.06 % nasal spray, 2 sprays into the nostril(s) as directed by provider 4 (Four) Times a Day., Disp: 15 mL, Rfl: 0  •  methylPREDNISolone (MEDROL) 4 MG dose pack, Take as directed on package instructions., Disp: 21 tablet, Rfl: 0    Allergies:   No Known Allergies    Objective     Vital Signs  Pulse 100   Temp 97.3 °F (36.3 °C) (Temporal)   Ht 172.7 cm (67.99\")   Wt 79.7 kg (175 lb 12.8 oz)   SpO2 98%   BMI 26.74 kg/m²   Estimated body mass index is 26.74 kg/m² as calculated from the following:    Height as of this encounter: 172.7 cm (67.99\").    Weight as of this encounter: 79.7 kg (175 lb 12.8 oz).          Physical Exam  Constitutional:       General: She is not in acute distress.     Appearance: Normal appearance. She is not ill-appearing.   HENT:      Right Ear: Tympanic membrane normal.      Left Ear: Tympanic membrane normal.      Mouth/Throat:      Mouth: Mucous membranes are moist.      Pharynx: Posterior oropharyngeal erythema present. No oropharyngeal exudate.   Cardiovascular:      Rate and Rhythm: Normal rate and regular rhythm.      Pulses: Normal pulses.      Heart sounds: Normal heart sounds.   Pulmonary:      Effort: Pulmonary effort is normal.      Breath sounds: Normal breath sounds.   Musculoskeletal:      Cervical back: No tenderness.   Neurological:      Mental Status: She is alert.          Result Review :              POCT Infectious mononucleosis antibody  Order: [id number]   Status: Final result      Visible to patient: No (scheduled for 2/21/2023  3:25 AM)      Next appt: 05/09/2023 at 08:15 AM in Internal Medicine (She Beaver, APRN)      Dx: Cough, unspecified type     Specimen Information: Blood    0 " Result Notes           Component  Ref Range & Units 13:25  (2/20/23) 4 d ago  (2/16/23) 4 d ago  (2/16/23) 1 yr ago  (1/3/22) 1 yr ago  (1/3/22) 1 yr ago  (10/30/21)   Monospot  Negative Negative         Internal Control  Passed Passed  Passed R  Passed R  Passed  Passed     Lot Number [id number]  [id number]  [id number]  [id number]  [id number]  [id number]    Expiration Date 12/31/23  6/1/2023  9/12/2024  10/25/23  4/30/22  02/28/2023    [facility]                 Assessment and Plan     1. Cough, unspecified type  2. Sore throat  3. Nasal congestion     You can cough for several weeks after a head/chest cold. Use Tylenol (acetaminophen) or Motrin (ibuprofen) for fever and pain control. Use a cool mist humidifier and increase fluids to thin out mucus and help with congestion.  Saline sinus rinses or saline nasal drops/spray can be used to thin out mucus. Flonase nose spray can help with sinus irritation and congestion. Try Vicks vapor rub to open up nose congestion. Use honey to help with cough. Use water gargles (with or without salt) to soothe sore throat and clear mucus out of back of throat.     - ipratropium (ATROVENT) 0.06 % nasal spray; 2 sprays into the nostril(s) as directed by provider 4 (Four) Times a Day.  Dispense: 15 mL; Refill: 0  - benzonatate (Tessalon Perles) 100 MG capsule; Take 1 capsule by mouth 3 (Three) Times a Day As Needed for Cough.  Dispense: 30 capsule; Refill: 0  - methylPREDNISolone (MEDROL) 4 MG dose pack; Take as directed on package instructions.  Dispense: 21 tablet; Refill: 0  - POCT Infectious mononucleosis antibody           Follow Up  No follow-ups on file.    Jared Villafana MD  [facility]  [facility]  [address] [address]  [phone]

## 2023-03-21 ENCOUNTER — TELEMEDICINE (OUTPATIENT)
Dept: INTERNAL MEDICINE | Facility: CLINIC | Age: 28
End: 2023-03-21
Payer: MEDICAID

## 2023-03-21 DIAGNOSIS — R05.9 COUGH, UNSPECIFIED TYPE: Primary | ICD-10-CM

## 2023-03-21 DIAGNOSIS — J02.9 SORE THROAT: ICD-10-CM

## 2023-03-21 DIAGNOSIS — J40 BRONCHITIS: ICD-10-CM

## 2023-03-21 DIAGNOSIS — R09.81 NASAL CONGESTION: ICD-10-CM

## 2023-03-21 DIAGNOSIS — J01.00 ACUTE NON-RECURRENT MAXILLARY SINUSITIS: ICD-10-CM

## 2023-03-21 LAB
EXPIRATION DATE: NORMAL
FLUAV AG UPPER RESP QL IA.RAPID: NOT DETECTED
FLUBV AG UPPER RESP QL IA.RAPID: NOT DETECTED
INTERNAL CONTROL: NORMAL
Lab: NORMAL
SARS-COV-2 AG UPPER RESP QL IA.RAPID: NOT DETECTED

## 2023-03-21 PROCEDURE — 99213 OFFICE O/P EST LOW 20 MIN: CPT | Performed by: FAMILY MEDICINE

## 2023-03-21 PROCEDURE — 1159F MED LIST DOCD IN RCRD: CPT | Performed by: FAMILY MEDICINE

## 2023-03-21 PROCEDURE — 87428 SARSCOV & INF VIR A&B AG IA: CPT | Performed by: FAMILY MEDICINE

## 2023-03-21 PROCEDURE — 1160F RVW MEDS BY RX/DR IN RCRD: CPT | Performed by: FAMILY MEDICINE

## 2023-03-21 RX ORDER — CEFDINIR 300 MG/1
300 CAPSULE ORAL 2 TIMES DAILY
Qty: 20 CAPSULE | Refills: 0 | Status: SHIPPED | OUTPATIENT
Start: 2023-03-21

## 2023-03-21 RX ORDER — METHYLPREDNISOLONE 4 MG/1
TABLET ORAL
Qty: 1 EACH | Refills: 0 | Status: SHIPPED | OUTPATIENT
Start: 2023-03-21 | End: 2023-03-30

## 2023-03-21 NOTE — PROGRESS NOTES
Telehealth Visit     Date: 2023   Patient Name: Melania Stevens  : 1995   MRN: [id number]     Chief Complaint:  No chief complaint on file.      This provider is located at the [facility] in [place], [place]. The patient is being seen remotely via telehealth at their home address in [place], and stated they are in a secure environment for this session. The patient's condition being diagnosed/treated is appropriate for telemedicine. The provider identified herself as well as her credentials. The patient, and/or patients guardian, consent to be seen remotely, and when consent is given they understand that the consent allows for patient identifiable information to be sent to a third party as needed. They may refuse to be seen remotely at any time. The electronic data is encrypted and password protected, and the patient and/or guardian has been advised of the potential risks to privacy not withstanding such measures.    You have chosen to receive care through a telehealth visit. Do you consent to use a video/audio connection for your medical care today? Yes    History of Present Illness: Melania Stevens is a 27 y.o. female who is here today to follow up with several concerns.  She states that she was treated about a month ago with some steroids and Tessalon Perles for cough and the cough is never completely gone away and she is coughing up discolored sputum and having chills and sweats having sinus drainage that is discolored mucus and she never feels like the cough has cleared you and she is concerned about infection.      Subjective      Review of Systems:   Review of Systems    I have reviewed and the following portions of the patient's history were updated as appropriate: past family history, past medical history, past social history, past surgical history and problem list.    Medications:     Current Outpatient Medications:   •  buPROPion XL (WELLBUTRIN XL) 150 MG 24  hr tablet, Take 1 tablet by mouth Every Morning., Disp: 30 tablet, Rfl: 6  •  cefdinir (OMNICEF) 300 MG capsule, Take 1 capsule by mouth 2 (Two) Times a Day., Disp: 20 capsule, Rfl: 0  •  methylPREDNISolone (MEDROL) 4 MG dose pack, Take as directed on package instructions., Disp: 1 each, Rfl: 0  •  Multiple Vitamins-Minerals (MULTIVITAMIN ADULT PO), Take 1 tablet by mouth Daily., Disp: , Rfl:   •  vilazodone (Viibryd) 40 MG tablet tablet, Take 1 tablet by mouth Daily., Disp: 30 tablet, Rfl: 6    Allergies:   No Known Allergies    Objective     Physical Exam:  Vital Signs: There were no vitals filed for this visit.  There is no height or weight on file to calculate BMI.    Physical Exam    Assessment / Plan      Assessment/Plan:   Diagnoses and all orders for this visit:    1. Cough, unspecified type (Primary)  -     POCT SARS-CoV-2 Antigen NATALIE + Flu    2. Sore throat    3. Nasal congestion  -     POCT SARS-CoV-2 Antigen NATALIE + Flu    4. Acute non-recurrent maxillary sinusitis  -     cefdinir (OMNICEF) 300 MG capsule; Take 1 capsule by mouth 2 (Two) Times a Day.  Dispense: 20 capsule; Refill: 0  -     methylPREDNISolone (MEDROL) 4 MG dose pack; Take as directed on package instructions.  Dispense: 1 each; Refill: 0  -     POCT SARS-CoV-2 Antigen NATALIE + Flu    5. Bronchitis  -     cefdinir (OMNICEF) 300 MG capsule; Take 1 capsule by mouth 2 (Two) Times a Day.  Dispense: 20 capsule; Refill: 0  -     methylPREDNISolone (MEDROL) 4 MG dose pack; Take as directed on package instructions.  Dispense: 1 each; Refill: 0         Follow Up:   Return if symptoms worsen or fail to improve, for ROWAN Beaver.    Any medications prescribed have been sent electronically to   [facility] [id number] - [facility] [address] [place] [address] - [phone]  - [phone]   [address] [facility] [address]  Phone: [phone] Fax: [phone]        Mariah Cross DO

## 2023-03-30 ENCOUNTER — OFFICE VISIT (OUTPATIENT)
Dept: INTERNAL MEDICINE | Facility: CLINIC | Age: 28
End: 2023-03-30
Payer: MEDICAID

## 2023-03-30 VITALS
HEIGHT: 68 IN | SYSTOLIC BLOOD PRESSURE: 92 MMHG | BODY MASS INDEX: 26.52 KG/M2 | WEIGHT: 175 LBS | HEART RATE: 93 BPM | TEMPERATURE: 98 F | DIASTOLIC BLOOD PRESSURE: 56 MMHG | OXYGEN SATURATION: 97 %

## 2023-03-30 DIAGNOSIS — J06.9 UPPER RESPIRATORY TRACT INFECTION, UNSPECIFIED TYPE: Primary | ICD-10-CM

## 2023-03-30 DIAGNOSIS — R05.8 POST-VIRAL COUGH SYNDROME: ICD-10-CM

## 2023-03-30 PROCEDURE — 99213 OFFICE O/P EST LOW 20 MIN: CPT | Performed by: STUDENT IN AN ORGANIZED HEALTH CARE EDUCATION/TRAINING PROGRAM

## 2023-03-30 RX ORDER — IPRATROPIUM BROMIDE 42 UG/1
2 SPRAY, METERED NASAL 4 TIMES DAILY
Qty: 15 ML | Refills: 0 | Status: SHIPPED | OUTPATIENT
Start: 2023-03-30

## 2023-03-30 RX ORDER — GUAIFENESIN AND CODEINE PHOSPHATE 100; 10 MG/5ML; MG/5ML
10 SOLUTION ORAL 4 TIMES DAILY PRN
Qty: 120 ML | Refills: 0 | Status: SHIPPED | OUTPATIENT
Start: 2023-03-30 | End: 2023-04-02

## 2023-03-30 RX ORDER — CETIRIZINE HYDROCHLORIDE 10 MG/1
10 TABLET ORAL DAILY
Qty: 30 TABLET | Refills: 0 | Status: SHIPPED | OUTPATIENT
Start: 2023-03-30

## 2023-03-30 NOTE — PROGRESS NOTES
Office Note     Name: Melania Stevens    : 1995     MRN: [id number]     Chief Complaint  Cough (Was seen multiple times for same symptoms /), URI, and Fatigue    Subjective     History of Present Illness:  Mleania Stevens is a 27 y.o. female who presents today for     Follow up for worsening cough productive cough and chest congestion.Feels like she coughing up yellow mucus from her chest. She is having increasing coughing fits, which causes her ribs to hurt.  Was just seen on 3/21/2023 and was prescribe cefdinir and medrol tay. She was previously seen on 2023 for similar symptoms treated with medrol pack, tessalon perles which were not ineffective per patient. She was also prescribed ipratropium nasal spray which she did not use.    Review of Systems:   Review of Systems   All other systems reviewed and are negative.      Past Medical History:   Past Medical History:   Diagnosis Date   • ADHD (attention deficit hyperactivity disorder) hindsight, since kid. but diagnosed late @ age 26    family history of ADD   • Allergic as a kid    seasonal chronic acute sinusitis - some times leads to strep   • Anemia hindsight, i first remember symptoms in middle school    heavy menstrual cycle   • Anxiety first noticed in middle school    coping   • Costochondritis 2021   • Depression    • Fractures    • Headache middle school    now know triggers to avoid-unable to avoid during pd & wk bf   • History of medical problems     Adrenal Fatigue - tested 2022, hormonal imbalance (adrenal, lower testosterone and estrogen) and lower dopamine and norephenephrine - tested 2022   • Pap smear for cervical cancer screening        Past Surgical History:   Past Surgical History:   Procedure Laterality Date   • JOINT REPLACEMENT  July 15, 2020    MPFL reconstruction   • KNEE SURGERY Left 7/15/2021     Ortho Dr. Roman   • WISDOM TOOTH EXTRACTION         Family History:   Family History   Problem Relation Age  of Onset   • Migraines Mother    • Miscarriages / Stillbirths Mother         abt 3 miscarriages bf me (had cysts & then endometrial ablation after my birth)   • Stroke Mother         TIA - inconclusive, but potentially stress related   • Anxiety disorder Mother    • Arthritis Father    • Mental illness Father         ADHD   • Depression Father    • Stroke Paternal Uncle         heavy stress & unhealthy eating clogged 1 or 2 heart valves   • Arthritis Maternal Grandmother    • Anxiety disorder Sister    • Mental illness Sister         ADHD   • Ovarian cancer Neg Hx    • Breast cancer Neg Hx    • Uterine cancer Neg Hx    • Cervical cancer Neg Hx    • Lung cancer Neg Hx    • Colon cancer Neg Hx        Social History:   Social History     Socioeconomic History   • Marital status: Single   Tobacco Use   • Smoking status: Never   • Smokeless tobacco: Never   Vaping Use   • Vaping Use: Never used   Substance and Sexual Activity   • Alcohol use: Yes     Comment: very rarely/on occasion   • Drug use: Never   • Sexual activity: Never       Immunizations:   Immunization History   Administered Date(s) Administered   • COVID-19 (MODERNA) 1st, 2nd, 3rd Dose Only 03/16/2021, 04/13/2021   • FluLaval/Fluzone >6mos 10/24/2020   • Influenza, Unspecified 10/24/2020   • Tdap 01/01/2015   • flucelvax quad pfs =>4 YRS 12/07/2019        Medications:     Current Outpatient Medications:   •  buPROPion XL (WELLBUTRIN XL) 150 MG 24 hr tablet, Take 1 tablet by mouth Every Morning., Disp: 30 tablet, Rfl: 6  •  cefdinir (OMNICEF) 300 MG capsule, Take 1 capsule by mouth 2 (Two) Times a Day., Disp: 20 capsule, Rfl: 0  •  Multiple Vitamins-Minerals (MULTIVITAMIN ADULT PO), Take 1 tablet by mouth Daily., Disp: , Rfl:   •  vilazodone (Viibryd) 40 MG tablet tablet, Take 1 tablet by mouth Daily., Disp: 30 tablet, Rfl: 6  •  cetirizine (zyrTEC) 10 MG tablet, Take 1 tablet by mouth Daily., Disp: 30 tablet, Rfl: 0  •  guaiFENesin-codeine (GUAIFENESIN AC)  "100-10 MG/5ML liquid, Take 10 mL by mouth 4 (Four) Times a Day As Needed for Cough for up to 3 days., Disp: 120 mL, Rfl: 0  •  ipratropium (ATROVENT) 0.06 % nasal spray, 2 sprays into the nostril(s) as directed by provider 4 (Four) Times a Day., Disp: 15 mL, Rfl: 0  •  methylPREDNISolone (MEDROL) 4 MG dose pack, Take as directed on package instructions., Disp: 1 each, Rfl: 0    Allergies:   No Known Allergies    Objective     Vital Signs  BP 92/56   Pulse 93   Temp 98 °F (36.7 °C) (Temporal)   Ht 172.7 cm (67.99\")   Wt 79.4 kg (175 lb)   SpO2 97%   BMI 26.61 kg/m²   Estimated body mass index is 26.61 kg/m² as calculated from the following:    Height as of this encounter: 172.7 cm (67.99\").    Weight as of this encounter: 79.4 kg (175 lb).          Physical Exam  Constitutional:       Appearance: Normal appearance.   Cardiovascular:      Rate and Rhythm: Normal rate and regular rhythm.      Pulses: Normal pulses.      Heart sounds: Normal heart sounds.   Pulmonary:      Effort: Pulmonary effort is normal.      Breath sounds: Normal breath sounds.   Abdominal:      General: Abdomen is flat.      Palpations: Abdomen is soft.   Neurological:      Mental Status: She is alert.          Result Review :                  Assessment and Plan     1. Upper respiratory tract infection, unspecified type  2. Post-viral cough syndrome    Continue cough, which is likely due to a post viral cough sequlae. She has completed rounds of antibiotics treatment+steroids. Will continue to treat supportively with anti-histamine, cough suppressant and nasal spray.    - guaiFENesin-codeine (GUAIFENESIN AC) 100-10 MG/5ML liquid; Take 10 mL by mouth 4 (Four) Times a Day As Needed for Cough for up to 3 days.  Dispense: 120 mL; Refill: 0  - cetirizine (zyrTEC) 10 MG tablet; Take 1 tablet by mouth Daily.  Dispense: 30 tablet; Refill: 0  - ipratropium (ATROVENT) 0.06 % nasal spray; 2 sprays into the nostril(s) as directed by provider 4 (Four) " Times a Day.  Dispense: 15 mL; Refill: 0         Follow Up  No follow-ups on file.    MD MICHAEL [facility]E [facility] [address]  [facility]  [address]  [address] [address] [phone]

## 2023-06-05 DIAGNOSIS — F33.1 MODERATE EPISODE OF RECURRENT MAJOR DEPRESSIVE DISORDER: ICD-10-CM

## 2023-06-05 DIAGNOSIS — F41.9 ANXIETY: ICD-10-CM

## 2023-06-08 RX ORDER — BUPROPION HYDROCHLORIDE 150 MG/1
TABLET ORAL
Qty: 14 TABLET | Refills: 0 | Status: SHIPPED | OUTPATIENT
Start: 2023-06-08

## 2023-06-08 RX ORDER — VILAZODONE HYDROCHLORIDE 40 MG/1
TABLET ORAL
Qty: 14 TABLET | Refills: 0 | Status: SHIPPED | OUTPATIENT
Start: 2023-06-08

## 2023-06-16 ENCOUNTER — LAB (OUTPATIENT)
Dept: INTERNAL MEDICINE | Facility: CLINIC | Age: 28
End: 2023-06-16
Payer: MEDICAID

## 2023-06-16 DIAGNOSIS — R53.83 FATIGUE, UNSPECIFIED TYPE: ICD-10-CM

## 2023-06-16 DIAGNOSIS — F33.1 MODERATE EPISODE OF RECURRENT MAJOR DEPRESSIVE DISORDER: ICD-10-CM

## 2023-06-16 DIAGNOSIS — F41.9 ANXIETY: ICD-10-CM

## 2023-06-16 DIAGNOSIS — Z00.01 ANNUAL VISIT FOR GENERAL ADULT MEDICAL EXAMINATION WITH ABNORMAL FINDINGS: ICD-10-CM

## 2023-06-16 PROCEDURE — 36415 COLL VENOUS BLD VENIPUNCTURE: CPT | Performed by: NURSE PRACTITIONER

## 2023-06-17 LAB
25(OH)D3+25(OH)D2 SERPL-MCNC: 117 NG/ML (ref 30–100)
ALBUMIN SERPL-MCNC: 4.9 G/DL (ref 3.5–5.2)
ALBUMIN/GLOB SERPL: 2.6 G/DL
ALP SERPL-CCNC: 53 U/L (ref 39–117)
ALT SERPL-CCNC: 12 U/L (ref 1–33)
AST SERPL-CCNC: 9 U/L (ref 1–32)
BILIRUB SERPL-MCNC: 0.5 MG/DL (ref 0–1.2)
BUN SERPL-MCNC: 11 MG/DL (ref 6–20)
BUN/CREAT SERPL: 9.1 (ref 7–25)
CALCIUM SERPL-MCNC: 10 MG/DL (ref 8.6–10.5)
CHLORIDE SERPL-SCNC: 104 MMOL/L (ref 98–107)
CHOLEST SERPL-MCNC: 235 MG/DL (ref 0–200)
CO2 SERPL-SCNC: 26.9 MMOL/L (ref 22–29)
CREAT SERPL-MCNC: 1.21 MG/DL (ref 0.57–1)
EGFRCR SERPLBLD CKD-EPI 2021: 62 ML/MIN/1.73
ERYTHROCYTE [DISTWIDTH] IN BLOOD BY AUTOMATED COUNT: 13.8 % (ref 12.3–15.4)
FERRITIN SERPL-MCNC: 8.57 NG/ML (ref 13–150)
GLOBULIN SER CALC-MCNC: 1.9 GM/DL
GLUCOSE SERPL-MCNC: 93 MG/DL (ref 65–99)
HCT VFR BLD AUTO: 39.3 % (ref 34–46.6)
HDLC SERPL-MCNC: 79 MG/DL (ref 40–60)
HGB BLD-MCNC: 12.4 G/DL (ref 12–15.9)
LDLC SERPL CALC-MCNC: 146 MG/DL (ref 0–100)
MCH RBC QN AUTO: 24.1 PG (ref 26.6–33)
MCHC RBC AUTO-ENTMCNC: 31.6 G/DL (ref 31.5–35.7)
MCV RBC AUTO: 76.3 FL (ref 79–97)
PLATELET # BLD AUTO: 221 10*3/MM3 (ref 140–450)
POTASSIUM SERPL-SCNC: 5.4 MMOL/L (ref 3.5–5.2)
PROT SERPL-MCNC: 6.8 G/DL (ref 6–8.5)
RBC # BLD AUTO: 5.15 10*6/MM3 (ref 3.77–5.28)
SODIUM SERPL-SCNC: 141 MMOL/L (ref 136–145)
TRIGL SERPL-MCNC: 62 MG/DL (ref 0–150)
TSH SERPL DL<=0.005 MIU/L-ACNC: 1.83 UIU/ML (ref 0.27–4.2)
VIT B12 SERPL-MCNC: 1863 PG/ML (ref 211–946)
VLDLC SERPL CALC-MCNC: 10 MG/DL (ref 5–40)
WBC # BLD AUTO: 4.28 10*3/MM3 (ref 3.4–10.8)

## 2023-11-29 ENCOUNTER — LAB (OUTPATIENT)
Dept: INTERNAL MEDICINE | Facility: CLINIC | Age: 28
End: 2023-11-29
Payer: MEDICAID

## 2023-11-29 ENCOUNTER — OFFICE VISIT (OUTPATIENT)
Dept: INTERNAL MEDICINE | Facility: CLINIC | Age: 28
End: 2023-11-29
Payer: MEDICAID

## 2023-11-29 VITALS
HEIGHT: 68 IN | DIASTOLIC BLOOD PRESSURE: 82 MMHG | SYSTOLIC BLOOD PRESSURE: 118 MMHG | TEMPERATURE: 97.8 F | BODY MASS INDEX: 26.19 KG/M2 | HEART RATE: 80 BPM | WEIGHT: 172.8 LBS | OXYGEN SATURATION: 99 % | RESPIRATION RATE: 18 BRPM

## 2023-11-29 DIAGNOSIS — M25.50 MULTIPLE JOINT PAIN: ICD-10-CM

## 2023-11-29 DIAGNOSIS — F33.1 MODERATE EPISODE OF RECURRENT MAJOR DEPRESSIVE DISORDER: ICD-10-CM

## 2023-11-29 DIAGNOSIS — F41.9 ANXIETY: ICD-10-CM

## 2023-11-29 DIAGNOSIS — R53.83 FATIGUE, UNSPECIFIED TYPE: ICD-10-CM

## 2023-11-29 DIAGNOSIS — R53.83 FATIGUE, UNSPECIFIED TYPE: Primary | ICD-10-CM

## 2023-11-29 LAB
ALBUMIN SERPL-MCNC: 5 G/DL (ref 3.5–5.2)
ALBUMIN/GLOB SERPL: 2.6 G/DL
ALP SERPL-CCNC: 41 U/L (ref 39–117)
ALT SERPL-CCNC: 18 U/L (ref 1–33)
AST SERPL-CCNC: 18 U/L (ref 1–32)
BILIRUB SERPL-MCNC: 0.4 MG/DL (ref 0–1.2)
BUN SERPL-MCNC: 15 MG/DL (ref 6–20)
BUN/CREAT SERPL: 15.3 (ref 7–25)
CALCIUM SERPL-MCNC: 9.5 MG/DL (ref 8.6–10.5)
CHLORIDE SERPL-SCNC: 104 MMOL/L (ref 98–107)
CO2 SERPL-SCNC: 25.5 MMOL/L (ref 22–29)
CREAT SERPL-MCNC: 0.98 MG/DL (ref 0.57–1)
EGFRCR SERPLBLD CKD-EPI 2021: 79.8 ML/MIN/1.73
ERYTHROCYTE [DISTWIDTH] IN BLOOD BY AUTOMATED COUNT: 15.6 % (ref 12.3–15.4)
GLOBULIN SER CALC-MCNC: 1.9 GM/DL
GLUCOSE SERPL-MCNC: 93 MG/DL (ref 65–99)
HCT VFR BLD AUTO: 37.2 % (ref 34–46.6)
HGB BLD-MCNC: 11.6 G/DL (ref 12–15.9)
MCH RBC QN AUTO: 23.4 PG (ref 26.6–33)
MCHC RBC AUTO-ENTMCNC: 31.2 G/DL (ref 31.5–35.7)
MCV RBC AUTO: 75 FL (ref 79–97)
PLATELET # BLD AUTO: 186 10*3/MM3 (ref 140–450)
POTASSIUM SERPL-SCNC: 4.9 MMOL/L (ref 3.5–5.2)
PROT SERPL-MCNC: 6.9 G/DL (ref 6–8.5)
RBC # BLD AUTO: 4.96 10*6/MM3 (ref 3.77–5.28)
SODIUM SERPL-SCNC: 139 MMOL/L (ref 136–145)
WBC # BLD AUTO: 5.69 10*3/MM3 (ref 3.4–10.8)

## 2023-11-29 PROCEDURE — 36415 COLL VENOUS BLD VENIPUNCTURE: CPT | Performed by: NURSE PRACTITIONER

## 2023-11-29 RX ORDER — BUPROPION HYDROCHLORIDE 150 MG/1
150 TABLET ORAL EVERY MORNING
Qty: 30 TABLET | Refills: 6 | Status: SHIPPED | OUTPATIENT
Start: 2023-11-29

## 2023-11-29 RX ORDER — VILAZODONE HYDROCHLORIDE 40 MG/1
40 TABLET ORAL DAILY
Qty: 30 TABLET | Refills: 6 | Status: SHIPPED | OUTPATIENT
Start: 2023-11-29

## 2023-11-29 NOTE — PROGRESS NOTES
Subjective   Melania Stevens is a 28 y.o. female.     Chief Complaint   Patient presents with    Anxiety     Well controlled     Depression     Well controlled     Joint Pain     Pt states she she has been really achy and would like some labs done to check inflammatory markers, has had more frequent flare ups recently in last 2-3 months         PCP: She Beaver APRN    History of Present Illness   She is here for anxiety and depression. She reports these are well controlled. She is also experiencing joint pain and has been generally achy all over. She has had more frequent flareups over the last 2 to 3 months.    She states her mood is most impacted by her menstrual cycles and joint pain. She reports she has ADHD. She notes she is active and exercises routinely. She notes she is a  . She has lost weight. She advises she did not work out for 2 months secondary to reinjuring her rib muscle. She reports when she has a flare up her neck hurts and she gets bumps on her neck. She notes it has been approximately 2 to 3 weeks since she has had the flare up. She reports she in physical therapy.    She reports she has a gynecology appointment later today. She is terrified of trying birth control because she can not handle any other side effects. She has premenstrual dysphoric disorder. She states she has cramping, inflammation, and heavy bleeding.     The following portions of the patient's history were reviewed and updated as appropriate: allergies, current medications, past family history, past medical history, past social history, past surgical history and problem list.        Review of Systems   Constitutional:  Negative for fatigue, fever and unexpected weight loss.   Eyes:  Negative for blurred vision, double vision and visual disturbance.   Respiratory:  Negative for cough, shortness of breath and wheezing.    Cardiovascular:  Negative for chest pain, palpitations and leg swelling.    Gastrointestinal:  Negative for abdominal pain, constipation, diarrhea, nausea and vomiting.   Genitourinary:  Negative for difficulty urinating, frequency and urgency.   Musculoskeletal:  Positive for arthralgias. Negative for myalgias.   Skin:  Negative for color change and rash.   Neurological:  Negative for dizziness, weakness and headache.   Hematological:  Negative for adenopathy. Does not bruise/bleed easily.   Psychiatric/Behavioral:  Positive for depressed mood. The patient is nervous/anxious.            Outpatient Medications Marked as Taking for the 11/29/23 encounter (Office Visit) with She Beaver APRN   Medication Sig Dispense Refill    buPROPion XL (WELLBUTRIN XL) 150 MG 24 hr tablet Take 1 tablet by mouth Every Morning. 30 tablet 6    vilazodone (VIIBRYD) 40 MG tablet tablet Take 1 tablet by mouth Daily. 30 tablet 6    [DISCONTINUED] buPROPion XL (WELLBUTRIN XL) 150 MG 24 hr tablet Take 1 tablet by mouth Every Morning. 30 tablet 6    [DISCONTINUED] vilazodone (VIIBRYD) 40 MG tablet tablet Take 1 tablet by mouth Daily. 30 tablet 6     No Known Allergies        Objective   Physical Exam  Constitutional:       Appearance: Normal appearance. She is well-developed.   HENT:      Head: Normocephalic and atraumatic.   Eyes:      General: No scleral icterus.     Conjunctiva/sclera: Conjunctivae normal.   Cardiovascular:      Rate and Rhythm: Normal rate and regular rhythm.      Heart sounds: Normal heart sounds.   Pulmonary:      Effort: Pulmonary effort is normal. No respiratory distress.      Breath sounds: Normal breath sounds.   Abdominal:      General: Bowel sounds are normal.      Palpations: Abdomen is soft.      Tenderness: There is no abdominal tenderness.   Musculoskeletal:         General: Normal range of motion.      Cervical back: Normal range of motion.      Right lower leg: No edema.      Left lower leg: No edema.   Skin:     General: Skin is warm and dry.   Neurological:      General:  "No focal deficit present.      Mental Status: She is alert and oriented to person, place, and time.   Psychiatric:         Attention and Perception: Attention normal.         Mood and Affect: Mood and affect normal.         Behavior: Behavior normal. Behavior is cooperative.         Thought Content: Thought content normal.         Cognition and Memory: Cognition normal.         Judgment: Judgment normal.         Vitals:    11/29/23 0931   BP: 118/82   BP Location: Right arm   Patient Position: Sitting   Cuff Size: Adult   Pulse: 80   Resp: 18   Temp: 97.8 °F (36.6 °C)   TempSrc: Infrared   SpO2: 99%   Weight: 78.4 kg (172 lb 12.8 oz)   Height: 173.2 cm (68.2\")     Body mass index is 26.12 kg/m².  Wt Readings from Last 3 Encounters:   11/29/23 78.4 kg (172 lb 12.8 oz)   06/16/23 79.6 kg (175 lb 6.4 oz)   03/30/23 79.4 kg (175 lb)             Assessment & Plan   Diagnoses and all orders for this visit:    1. Fatigue, unspecified type (Primary)  -     CBC (No Diff); Future  -     Comprehensive Metabolic Panel; Future  -     Sedimentation rate, automated; Future  -     C-reactive protein; Future  -     PITA by IFA, Reflex 9-biomarkers profile; Future    2. Anxiety  -     buPROPion XL (WELLBUTRIN XL) 150 MG 24 hr tablet; Take 1 tablet by mouth Every Morning.  Dispense: 30 tablet; Refill: 6  -     CBC (No Diff); Future  -     Comprehensive Metabolic Panel; Future    3. Moderate episode of recurrent major depressive disorder  -     buPROPion XL (WELLBUTRIN XL) 150 MG 24 hr tablet; Take 1 tablet by mouth Every Morning.  Dispense: 30 tablet; Refill: 6  -     vilazodone (VIIBRYD) 40 MG tablet tablet; Take 1 tablet by mouth Daily.  Dispense: 30 tablet; Refill: 6  -     CBC (No Diff); Future  -     Comprehensive Metabolic Panel; Future    4. Multiple joint pain  -     CBC (No Diff); Future  -     Comprehensive Metabolic Panel; Future  -     Sedimentation rate, automated; Future  -     C-reactive protein; Future  -     PITA by " IFA, Reflex 9-biomarkers profile; Future      Multiple joint pain-chronic, worse recently  - We will obtain updated labs.    Anxiety and depression  - Stable. She will continue current medications. Refills were sent to her pharmacy.          Return in about 6 months (around 5/29/2024) for Annual.    I discussed my findings,recommendations, and plan of care was with the patient. They verbalized understanding and agreement.  Patient was encouraged to keep me informed of any acute changes, lack of improvement, or any new concerning symptoms.     Transcribed from ambient dictation for DOMENIC Stover by Kash Mcintosh.  11/29/23   11:50 EST    Patient or patient representative verbalized consent to the visit recording.  I have personally performed the services described in this document as transcribed by the above individual, and it is both accurate and complete.  DOMENIC Stover  11/29/2023  12:44 EST

## 2023-12-01 LAB
ANA SER QL IF: NEGATIVE
CRP SERPL-MCNC: <0.3 MG/DL (ref 0–0.5)
ERYTHROCYTE [SEDIMENTATION RATE] IN BLOOD BY WESTERGREN METHOD: 2 MM/HR (ref 0–20)
LABORATORY COMMENT REPORT: NORMAL

## 2024-05-03 ENCOUNTER — OFFICE VISIT (OUTPATIENT)
Dept: INTERNAL MEDICINE | Facility: CLINIC | Age: 29
End: 2024-05-03
Payer: MEDICAID

## 2024-05-03 VITALS
DIASTOLIC BLOOD PRESSURE: 62 MMHG | RESPIRATION RATE: 12 BRPM | OXYGEN SATURATION: 100 % | HEIGHT: 68 IN | SYSTOLIC BLOOD PRESSURE: 102 MMHG | HEART RATE: 66 BPM | TEMPERATURE: 97.5 F | BODY MASS INDEX: 26.67 KG/M2 | WEIGHT: 176 LBS

## 2024-05-03 DIAGNOSIS — N92.0 MENORRHAGIA WITH REGULAR CYCLE: ICD-10-CM

## 2024-05-03 DIAGNOSIS — N94.6 DYSMENORRHEA: ICD-10-CM

## 2024-05-03 DIAGNOSIS — M25.50 MULTIPLE JOINT PAIN: Primary | ICD-10-CM

## 2024-05-03 PROCEDURE — 99213 OFFICE O/P EST LOW 20 MIN: CPT | Performed by: NURSE PRACTITIONER

## 2024-05-03 PROCEDURE — 1160F RVW MEDS BY RX/DR IN RCRD: CPT | Performed by: NURSE PRACTITIONER

## 2024-05-03 PROCEDURE — 1159F MED LIST DOCD IN RCRD: CPT | Performed by: NURSE PRACTITIONER

## 2024-05-03 PROCEDURE — 1125F AMNT PAIN NOTED PAIN PRSNT: CPT | Performed by: NURSE PRACTITIONER

## 2024-05-07 NOTE — PROGRESS NOTES
Subjective   Melania Stevens is a 29 y.o. female.     Chief Complaint   Patient presents with    discussion     Pt would like to discuss upcoming surgery she has planned not yet scheduled        PCP: She Beaver APRN    History of Present Illness /Review of systems    History of Present Illness  The patient presents for evaluation of menstrual issues and related iron deficiency     The patient expresses a desire for an exploratory laparotomy, as recommended by a local gynecologist. She is contemplating another intrauterine device (IUD) in the future and has explored various modalities to alleviate her symptoms. Despite undergoing imaging, she expresses a desire to avoid further investigations to determine the cause of her menstrual cycle issues.   Her menstrual symptoms, which began in the 5th grade, have been characterized by heavy menstrual flow, necessitating the use of a Jumbo menstrual cup. She experiences lower back pain, severe cramps, pressure, and throbbing, which are consistent and exacerbated during menstrual periods and occasionally during ovulation.   The intensity of these symptoms fluctuates.     Despite taking iron supplements prescribed by her gynecologist for the past 5 months, her symptoms have not improved. She has also made dietary changes, including increased consumption of beef, over the past few years. She transitioned to a menstrual cup 5 years ago due to excessive bleeding. Her menstrual flow has decreased from closer to 200 mL to 15 mL after increasing her exercise and dietary modifications. However, she continues to lose 150 mL of blood monthly. Her menstrual cycles are regular.     Her muilti joint pain is more severe during menstrual periods and ovulation.   Her inflammatory markers and celiac disease have been evaluated, and rheumatology has ruled out any significant findings. She has been adhering to a strict FODMAP diet and has been engaging in physical activity, including  "cardio workouts. She is working on stress management and attending counseling. Her sleep quality is poor. She underwent a pelvic ultrasound in 2023 and another by her current gynecologist, Dr. Rios, at [facility], which revealed no cysts or thickening of the endometrial lining. She underwent MRIs a few years ago, which did not reveal any significant findings. Her last Pap smear was 2 years ago, yielded normal results. She is currently seeing a pelvic floor therapist at [facility].    Results  Imaging  Pelvic ultrasound showed no cysts or thickening of the endometrial lining.    The following portions of the patient's history were reviewed and updated as appropriate: allergies, current medications, past family history, past medical history, past social history, past surgical history and problem list.        Outpatient Medications Marked as Taking for the 5/3/24 encounter (Office Visit) with She Beaver APRN   Medication Sig Dispense Refill    buPROPion XL (WELLBUTRIN XL) 150 MG 24 hr tablet Take 1 tablet by mouth Every Morning. 30 tablet 6    vilazodone (VIIBRYD) 40 MG tablet tablet Take 1 tablet by mouth Daily. 30 tablet 6     No Known Allergies        Objective     Vitals:    05/03/24 1055   BP: 102/62   BP Location: Right arm   Patient Position: Sitting   Cuff Size: Adult   Pulse: 66   Resp: 12   Temp: 97.5 °F (36.4 °C)   TempSrc: Infrared   SpO2: 100%   Weight: 79.8 kg (176 lb)   Height: 173.2 cm (68.19\")     Body mass index is 26.61 kg/m².  Wt Readings from Last 3 Encounters:   05/03/24 79.8 kg (176 lb)   01/20/24 78 kg (172 lb)   11/29/23 78.4 kg (172 lb 12.8 oz)     Physical Exam  Constitutional:       Appearance: Normal appearance. She is well-developed.   HENT:      Head: Normocephalic and atraumatic.   Eyes:      General: No scleral icterus.     Conjunctiva/sclera: Conjunctivae normal.   Cardiovascular:      Rate and Rhythm: Normal rate and regular rhythm.     "  Heart sounds: Normal heart sounds.   Pulmonary:      Effort: Pulmonary effort is normal. No respiratory distress.      Breath sounds: Normal breath sounds.   Abdominal:      General: Bowel sounds are normal.      Palpations: Abdomen is soft.      Tenderness: There is no abdominal tenderness.   Musculoskeletal:         General: Normal range of motion.      Cervical back: Normal range of motion.      Right lower leg: No edema.      Left lower leg: No edema.   Skin:     General: Skin is warm and dry.   Neurological:      General: No focal deficit present.      Mental Status: She is alert and oriented to person, place, and time.   Psychiatric:         Attention and Perception: Attention normal.         Mood and Affect: Mood and affect normal.         Behavior: Behavior normal. Behavior is cooperative.         Thought Content: Thought content normal.         Cognition and Memory: Cognition normal.         Judgment: Judgment normal.               Assessment & Plan   Diagnoses and all orders for this visit:    1. Multiple joint pain (Primary)    2. Menorrhagia with regular cycle  -     Ambulatory Referral to Gynecology    3. Dysmenorrhea  -     Ambulatory Referral to Gynecology        Assessment & Plan  1. Menorrhagia.    There is a high suspicion of endometriosis warranting exploratory lap for confirmation as well as treatment.   A referral has been made to Dr. Rico at the [facility].              Return if symptoms worsen or fail to improve.    I discussed my findings,recommendations, and plan of care was with the patient. They verbalized understanding and agreement.  Patient was encouraged to keep me informed of any acute changes, lack of improvement, or any new concerning symptoms.     Patient or patient representative verbalized consent for the use of Ambient Listening during the visit with  DOMENIC Stover for chart documentation. 5/14/2024  13:25 EDT

## 2024-07-15 ENCOUNTER — TELEPHONE (OUTPATIENT)
Dept: INTERNAL MEDICINE | Facility: CLINIC | Age: 29
End: 2024-07-15
Payer: MEDICAID

## 2024-07-15 DIAGNOSIS — F33.1 MODERATE EPISODE OF RECURRENT MAJOR DEPRESSIVE DISORDER: ICD-10-CM

## 2024-07-15 NOTE — TELEPHONE ENCOUNTER
Caller: Melania Stevens    Relationship: Self    Best call back number:      Requested Prescriptions:   Requested Prescriptions     Pending Prescriptions Disp Refills    vilazodone (VIIBRYD) 40 MG tablet tablet 30 tablet 6     Sig: Take 1 tablet by mouth Daily.        Pharmacy where request should be sent: [facility] [id number] [place] [id number] [facility] [phone] [facility] [phone] FX     Last office visit with prescribing clinician: 5/3/2024   Last telemedicine visit with prescribing clinician: Visit date not found   Next office visit with prescribing clinician: Visit date not found     Additional details provided by patient: PATIENT ADVISED SHE WILL HAVE ENOUGH UNTIL MONDAY [id number] HOWEVER INSURANCE IS NEEDING AN AUTHORIZATION FOR THE MEDICATION      Does the patient have less than a 3 day supply:  [] Yes  [x] No      Javan Cordova Rep   07/15/24 15:44 EDT

## 2024-07-16 DIAGNOSIS — F41.9 ANXIETY: ICD-10-CM

## 2024-07-16 DIAGNOSIS — F33.1 MODERATE EPISODE OF RECURRENT MAJOR DEPRESSIVE DISORDER: ICD-10-CM

## 2024-07-17 ENCOUNTER — PRIOR AUTHORIZATION (OUTPATIENT)
Dept: INTERNAL MEDICINE | Facility: CLINIC | Age: 29
End: 2024-07-17
Payer: MEDICAID

## 2024-07-17 RX ORDER — BUPROPION HYDROCHLORIDE 150 MG/1
150 TABLET ORAL EVERY MORNING
Qty: 30 TABLET | Refills: 6 | Status: SHIPPED | OUTPATIENT
Start: 2024-07-17

## 2024-07-17 RX ORDER — VILAZODONE HYDROCHLORIDE 40 MG/1
40 TABLET ORAL DAILY
Qty: 30 TABLET | Refills: 6 | Status: SHIPPED | OUTPATIENT
Start: 2024-07-17

## 2024-07-17 NOTE — TELEPHONE ENCOUNTER
Approved today  The request has been approved. The authorization is effective from 07/17/2024 to 07/17/2025, as long as the member is enrolled in their current health plan. A written notification letter will follow with additional details.    Pt has been notified, pharmacy as well.

## 2025-02-10 DIAGNOSIS — F33.1 MODERATE EPISODE OF RECURRENT MAJOR DEPRESSIVE DISORDER: ICD-10-CM

## 2025-02-10 DIAGNOSIS — F41.9 ANXIETY: ICD-10-CM

## 2025-02-13 RX ORDER — BUPROPION HYDROCHLORIDE 150 MG/1
150 TABLET ORAL EVERY MORNING
Qty: 30 TABLET | Refills: 0 | Status: SHIPPED | OUTPATIENT
Start: 2025-02-13

## 2025-03-11 DIAGNOSIS — F33.1 MODERATE EPISODE OF RECURRENT MAJOR DEPRESSIVE DISORDER: ICD-10-CM

## 2025-03-11 DIAGNOSIS — F41.9 ANXIETY: ICD-10-CM

## 2025-03-13 RX ORDER — VILAZODONE HYDROCHLORIDE 40 MG/1
40 TABLET ORAL DAILY
Qty: 90 TABLET | OUTPATIENT
Start: 2025-03-13

## 2025-03-13 RX ORDER — BUPROPION HYDROCHLORIDE 150 MG/1
150 TABLET ORAL EVERY MORNING
Qty: 30 TABLET | Refills: 0 | OUTPATIENT
Start: 2025-03-13

## 2025-03-14 DIAGNOSIS — F41.9 ANXIETY: ICD-10-CM

## 2025-03-14 DIAGNOSIS — F33.1 MODERATE EPISODE OF RECURRENT MAJOR DEPRESSIVE DISORDER: ICD-10-CM

## 2025-03-14 RX ORDER — BUPROPION HYDROCHLORIDE 150 MG/1
150 TABLET ORAL EVERY MORNING
Qty: 30 TABLET | Refills: 0 | Status: SHIPPED | OUTPATIENT
Start: 2025-03-14

## 2025-03-14 RX ORDER — VILAZODONE HYDROCHLORIDE 40 MG/1
40 TABLET ORAL DAILY
Qty: 30 TABLET | Refills: 0 | Status: SHIPPED | OUTPATIENT
Start: 2025-03-14

## 2025-03-14 NOTE — TELEPHONE ENCOUNTER
Caller: Melania Stevens HENRY    Relationship: Self    Best call back number: [phone]     Requested Prescriptions:   Requested Prescriptions     Pending Prescriptions Disp Refills    buPROPion XL (WELLBUTRIN XL) 150 MG 24 hr tablet 30 tablet 0     Sig: Take 1 tablet by mouth Every Morning.    vilazodone (VIIBRYD) 40 MG tablet tablet 30 tablet 6     Sig: Take 1 tablet by mouth Daily.        Pharmacy where request should be sent: [facility] [id number] [address] [phone] [facility] [phone] FX     Last office visit with prescribing clinician: 5/3/2024   Last telemedicine visit with prescribing clinician: Visit date not found   Next office visit with prescribing clinician: 4/4/2025     Additional details provided by patient: PATIENT HAS 2 DAYS LEFT. SHE HAS AN APPOINTMENT SCHEDULED ON 04/04/25 AND WILL NEED ENOUGH MEDICATION CALLED IN TO GET HER THROUGH UNTIL THE APPOINTMENT.     Does the patient have less than a 3 day supply:  [x] Yes  [] No    Would you like a call back once the refill request has been completed: [] Yes [x] No    If the office needs to give you a call back, can they leave a voicemail: [] Yes [x] No    Javan Reza Rep   03/14/25 10:12 EDT

## 2025-04-04 ENCOUNTER — OFFICE VISIT (OUTPATIENT)
Dept: INTERNAL MEDICINE | Facility: CLINIC | Age: 30
End: 2025-04-04
Payer: MEDICAID

## 2025-04-04 VITALS
HEIGHT: 68 IN | WEIGHT: 170.2 LBS | BODY MASS INDEX: 25.79 KG/M2 | RESPIRATION RATE: 14 BRPM | SYSTOLIC BLOOD PRESSURE: 116 MMHG | OXYGEN SATURATION: 98 % | DIASTOLIC BLOOD PRESSURE: 58 MMHG | TEMPERATURE: 97.5 F | HEART RATE: 72 BPM

## 2025-04-04 DIAGNOSIS — F41.9 ANXIETY: ICD-10-CM

## 2025-04-04 DIAGNOSIS — F33.1 MODERATE EPISODE OF RECURRENT MAJOR DEPRESSIVE DISORDER: ICD-10-CM

## 2025-04-04 PROCEDURE — 1159F MED LIST DOCD IN RCRD: CPT | Performed by: NURSE PRACTITIONER

## 2025-04-04 PROCEDURE — 99214 OFFICE O/P EST MOD 30 MIN: CPT | Performed by: NURSE PRACTITIONER

## 2025-04-04 PROCEDURE — 1125F AMNT PAIN NOTED PAIN PRSNT: CPT | Performed by: NURSE PRACTITIONER

## 2025-04-04 PROCEDURE — 1160F RVW MEDS BY RX/DR IN RCRD: CPT | Performed by: NURSE PRACTITIONER

## 2025-04-04 RX ORDER — BUPROPION HYDROCHLORIDE 150 MG/1
150 TABLET ORAL EVERY MORNING
Qty: 90 TABLET | Refills: 2 | Status: SHIPPED | OUTPATIENT
Start: 2025-04-04

## 2025-04-04 RX ORDER — VILAZODONE HYDROCHLORIDE 40 MG/1
40 TABLET ORAL DAILY
Qty: 90 TABLET | Refills: 2 | Status: SHIPPED | OUTPATIENT
Start: 2025-04-04

## 2025-04-04 NOTE — PROGRESS NOTES
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     Here to FU on anxiety and depression.   Not interested in med adjustments today.    Feels like in a much better place than she was 2-3 years ago.    Got mirena IUD to help with hormonal balances.   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   Working as a .       PHQ-9 Depression Screening  Little interest or pleasure in doing things? Several days   Feeling down, depressed, or hopeless? Several days   PHQ-2 Total Score 2   Trouble falling or staying asleep, or sleeping too much? Not at all   Feeling tired or having little energy? Several days (not because she has felt down)   Poor appetite or overeating? Not at all   Feeling bad about yourself - or that you are a failure or have let yourself or your family down? Several days   Trouble concentrating on things, such as reading the newspaper or watching television? Nearly every day   Moving or speaking so slowly that other people could have noticed? Or the opposite - being so fidgety or restless that you have been moving around a lot more than usual? Several days     Thoughts that you would be better off dead, or of hurting yourself in some way? Not at all   PHQ-9 Total Score 8   If you checked off any problems, how difficult have these problems made it for you to do your work, take care of things at home, or get along with other people?  Somewhat difficult       Anxiety CHEO-7    Feeling nervous, anxious or on edge: Several days  Not being able to stop or control worrying: Several days  Worrying too much about different things: Several days  Trouble Relaxing: Several days  Being so restless that it is hard to sit still: More than half the days  Becoming easily annoyed or irritable: Several days  Feeling afraid as if something awful might happen: Not at all  CHEO 7 Total Score: 7  If you checked any problems, how difficult have these problems made it for you to do your work, take care of things at home, or get along with other people: Very difficult         Health Maintenance:   Health Maintenance   Topic Date Due    PAP SMEAR  03/17/2023    ANNUAL PHYSICAL  06/16/2024    TDAP/TD VACCINES (2 - Td or Tdap) 10/01/2025 (Originally 1/1/2025)    COVID-19 Vaccine (3 - 2024-25 season) 10/04/2025 (Originally 9/1/2024)    INFLUENZA VACCINE  07/01/2025    Pneumococcal Vaccine 0-49  Aged Out    HEPATITIS C SCREENING  Discontinued       Review of Systems   Constitutional:  Negative for chills, diaphoresis, fatigue and fever.   HENT:  Negative for congestion, sore throat and swollen glands.    Respiratory:  Negative for cough.    Cardiovascular:  Negative for chest pain.   Gastrointestinal:  Negative for abdominal pain, anorexia, nausea and vomiting.   Genitourinary:  Negative for dysuria.   Musculoskeletal:  Negative for joint pain, myalgias and neck pain.   Skin:  Negative for rash.   Neurological:  Negative for vertigo, weakness and numbness.         No Known Allergies  Current Outpatient Medications on File Prior to Visit   Medication Sig Dispense Refill    [DISCONTINUED] buPROPion XL (WELLBUTRIN XL) 150 MG 24 hr tablet Take 1 tablet by mouth Every Morning. 30 tablet 0    [DISCONTINUED] vilazodone (VIIBRYD) 40 MG tablet tablet Take 1 tablet by mouth Daily. 30 tablet 0     No current facility-administered medications on file prior to visit.         The following  "portions of the patient's history were reviewed and updated as appropriate: allergies, current medications, past family history, past medical history, past social history, past surgical history and problem list and are available for review within electronic record    Objective         Vital Signs:   /58 (BP Location: Left arm, Patient Position: Sitting, Cuff Size: Adult)   Pulse 72   Temp 97.5 °F (36.4 °C) (Infrared)   Resp 14   Ht 173.2 cm (68.19\")   Wt 77.2 kg (170 lb 3.2 oz)   SpO2 98%   BMI 25.73 kg/m²         Physical Exam  Constitutional:       Appearance: Normal appearance. She is well-developed.   HENT:      Head: Normocephalic and atraumatic.   Eyes:      General: No scleral icterus.     Conjunctiva/sclera: Conjunctivae normal.   Cardiovascular:      Rate and Rhythm: Normal rate and regular rhythm.      Heart sounds: Normal heart sounds.   Pulmonary:      Effort: Pulmonary effort is normal. No respiratory distress.      Breath sounds: Normal breath sounds.   Abdominal:      General: Bowel sounds are normal.      Palpations: Abdomen is soft.      Tenderness: There is no abdominal tenderness.   Musculoskeletal:         General: Normal range of motion.      Cervical back: Normal range of motion.      Right lower leg: No edema.      Left lower leg: No edema.   Skin:     General: Skin is warm and dry.   Neurological:      General: No focal deficit present.      Mental Status: She is alert and oriented to person, place, and time.   Psychiatric:         Attention and Perception: Attention normal.         Mood and Affect: Mood and affect normal.         Behavior: Behavior normal. Behavior is cooperative.         Thought Content: Thought content normal.         Cognition and Memory: Cognition normal.         Judgment: Judgment normal.               Assessment and Plan      Diagnoses and all orders for this visit:    1. Anxiety  -     buPROPion XL (WELLBUTRIN XL) 150 MG 24 hr tablet; Take 1 tablet by " mouth Every Morning.  Dispense: 90 tablet; Refill: 2    2. Moderate episode of recurrent major depressive disorder  -     buPROPion XL (WELLBUTRIN XL) 150 MG 24 hr tablet; Take 1 tablet by mouth Every Morning.  Dispense: 90 tablet; Refill: 2  -     vilazodone (VIIBRYD) 40 MG tablet tablet; Take 1 tablet by mouth Daily.  Dispense: 90 tablet; Refill: 2    Stable. No safety risks identified.   Continue current meds.       Follow Up     Patient was given instructions and counseling regarding her condition or for health maintenance advice. Please see specific information pulled into the AVS if appropriate.   Any new medication's adverse effects have been discussed in detail with patient  Patient was encouraged to keep me informed of any acute changes, lack of improvement, or any new concerning symptoms.    Return in about 6 months (around 10/4/2025) for Annual.          Dictated Utilizing Dragon Dictation   Please note that portions of this note were completed with a voice recognition program.   Part of this note may be an electronic transcription/translation of spoken language to printed text using the Dragon Dictation System.

## 2025-04-13 DIAGNOSIS — F41.9 ANXIETY: ICD-10-CM

## 2025-04-13 DIAGNOSIS — F33.1 MODERATE EPISODE OF RECURRENT MAJOR DEPRESSIVE DISORDER: ICD-10-CM

## 2025-04-16 RX ORDER — BUPROPION HYDROCHLORIDE 150 MG/1
150 TABLET ORAL EVERY MORNING
Qty: 30 TABLET | OUTPATIENT
Start: 2025-04-16